# Patient Record
Sex: FEMALE | Race: WHITE | Employment: FULL TIME | URBAN - METROPOLITAN AREA
[De-identification: names, ages, dates, MRNs, and addresses within clinical notes are randomized per-mention and may not be internally consistent; named-entity substitution may affect disease eponyms.]

---

## 2018-11-16 ENCOUNTER — OFFICE VISIT (OUTPATIENT)
Dept: INTERNAL MEDICINE CLINIC | Age: 53
End: 2018-11-16
Payer: COMMERCIAL

## 2018-11-16 VITALS
OXYGEN SATURATION: 98 % | WEIGHT: 141 LBS | BODY MASS INDEX: 20.88 KG/M2 | HEART RATE: 100 BPM | SYSTOLIC BLOOD PRESSURE: 108 MMHG | HEIGHT: 69 IN | DIASTOLIC BLOOD PRESSURE: 64 MMHG

## 2018-11-16 DIAGNOSIS — Z00.00 ROUTINE PHYSICAL EXAMINATION: ICD-10-CM

## 2018-11-16 DIAGNOSIS — M25.50 POLYARTHRALGIA: ICD-10-CM

## 2018-11-16 DIAGNOSIS — Z76.89 ENCOUNTER TO ESTABLISH CARE: ICD-10-CM

## 2018-11-16 DIAGNOSIS — M79.18 MYOFASCIAL PAIN: Primary | ICD-10-CM

## 2018-11-16 PROCEDURE — 99204 OFFICE O/P NEW MOD 45 MIN: CPT | Performed by: FAMILY MEDICINE

## 2018-11-16 ASSESSMENT — PATIENT HEALTH QUESTIONNAIRE - PHQ9
1. LITTLE INTEREST OR PLEASURE IN DOING THINGS: 0
SUM OF ALL RESPONSES TO PHQ QUESTIONS 1-9: 0
2. FEELING DOWN, DEPRESSED OR HOPELESS: 0
SUM OF ALL RESPONSES TO PHQ9 QUESTIONS 1 & 2: 0
SUM OF ALL RESPONSES TO PHQ QUESTIONS 1-9: 0

## 2018-11-16 NOTE — PROGRESS NOTES
file prior to visit. No current facility-administered medications on file prior to visit. Review of Systems   Constitutional: Negative for activity change, appetite change, chills, fatigue, fever and unexpected weight change. HENT: Negative for congestion, nosebleeds, postnasal drip, rhinorrhea, sneezing, sore throat and trouble swallowing. Eyes: Negative for visual disturbance. Respiratory: Negative for cough, choking, shortness of breath and wheezing. Cardiovascular: Negative for chest pain and leg swelling. Gastrointestinal: Negative for abdominal pain, constipation, diarrhea, nausea and vomiting. Genitourinary: Negative for difficulty urinating, dysuria, vaginal discharge and vaginal pain. Musculoskeletal: Negative for arthralgias, back pain, neck pain and neck stiffness. Chronic pain issues are well-controlled   Skin: Negative for rash. Neurological: Negative for dizziness, weakness, numbness and headaches. Psychiatric/Behavioral: Negative for dysphoric mood and sleep disturbance. The patient is not nervous/anxious. Physical Exam   Constitutional: She is oriented to person, place, and time. She appears well-developed and well-nourished. No distress. Thin, pleasant   HENT:   Head: Normocephalic and atraumatic. Nose: Nose normal.   Mouth/Throat: Oropharynx is clear and moist.   Eyes: Conjunctivae and EOM are normal. Right eye exhibits no discharge. Left eye exhibits no discharge. Neck: Normal range of motion. Neck supple. No thyromegaly present. Cardiovascular: Normal rate, regular rhythm and normal heart sounds. No carotid bruits   Pulmonary/Chest: Effort normal and breath sounds normal. No respiratory distress. She has no wheezes. Abdominal: Soft. Bowel sounds are normal. She exhibits no distension and no mass. Musculoskeletal: Normal range of motion. She exhibits no edema. Lymphadenopathy:     She has no cervical adenopathy.    Neurological: She

## 2018-11-20 ASSESSMENT — ENCOUNTER SYMPTOMS
ABDOMINAL PAIN: 0
BACK PAIN: 0
WHEEZING: 0
VOMITING: 0
RHINORRHEA: 0
SORE THROAT: 0
NAUSEA: 0
CHOKING: 0
SHORTNESS OF BREATH: 0
COUGH: 0
TROUBLE SWALLOWING: 0
DIARRHEA: 0
CONSTIPATION: 0

## 2019-08-05 ENCOUNTER — OFFICE VISIT (OUTPATIENT)
Dept: FAMILY MEDICINE CLINIC | Age: 54
End: 2019-08-05
Payer: COMMERCIAL

## 2019-08-05 VITALS
OXYGEN SATURATION: 99 % | TEMPERATURE: 98.8 F | HEIGHT: 69 IN | HEART RATE: 80 BPM | SYSTOLIC BLOOD PRESSURE: 96 MMHG | DIASTOLIC BLOOD PRESSURE: 58 MMHG | WEIGHT: 143.8 LBS | BODY MASS INDEX: 21.3 KG/M2

## 2019-08-05 DIAGNOSIS — R30.0 DYSURIA: Primary | ICD-10-CM

## 2019-08-05 LAB
BILIRUBIN, POC: NEGATIVE
BLOOD URINE, POC: NORMAL
CLARITY, POC: NORMAL
COLOR, POC: NORMAL
GLUCOSE URINE, POC: NEGATIVE
KETONES, POC: NEGATIVE
LEUKOCYTE EST, POC: NORMAL
NITRITE, POC: NEGATIVE
PH, POC: 7
PROTEIN, POC: NEGATIVE
SPECIFIC GRAVITY, POC: 1.01
UROBILINOGEN, POC: 0.2

## 2019-08-05 PROCEDURE — 99203 OFFICE O/P NEW LOW 30 MIN: CPT | Performed by: FAMILY MEDICINE

## 2019-08-05 PROCEDURE — 81002 URINALYSIS NONAUTO W/O SCOPE: CPT | Performed by: FAMILY MEDICINE

## 2019-08-05 RX ORDER — CHLORAL HYDRATE 500 MG
1 CAPSULE ORAL DAILY
COMMUNITY

## 2019-08-05 RX ORDER — CIPROFLOXACIN 250 MG/1
250 TABLET, FILM COATED ORAL 2 TIMES DAILY
Qty: 6 TABLET | Refills: 0 | Status: SHIPPED | OUTPATIENT
Start: 2019-08-05 | End: 2019-08-08

## 2019-08-05 ASSESSMENT — ENCOUNTER SYMPTOMS
VOMITING: 0
SHORTNESS OF BREATH: 0
ABDOMINAL PAIN: 0
EYE PAIN: 0
NAUSEA: 0

## 2019-08-05 ASSESSMENT — PATIENT HEALTH QUESTIONNAIRE - PHQ9
2. FEELING DOWN, DEPRESSED OR HOPELESS: 0
SUM OF ALL RESPONSES TO PHQ QUESTIONS 1-9: 0
SUM OF ALL RESPONSES TO PHQ9 QUESTIONS 1 & 2: 0
1. LITTLE INTEREST OR PLEASURE IN DOING THINGS: 0
SUM OF ALL RESPONSES TO PHQ QUESTIONS 1-9: 0

## 2019-08-05 NOTE — PROGRESS NOTES
wheezing. Cardiovascular: Negative for chest pain and palpitations. Gastrointestinal: Negative for abdominal pain, blood in stool, constipation, diarrhea, nausea and vomiting. Genitourinary: Positive for dysuria. Negative for decreased urine volume, difficulty urinating, flank pain, hematuria, vaginal bleeding and vaginal discharge. Musculoskeletal: Negative for arthralgias and back pain. Skin: Negative for color change and rash. Neurological: Negative for dizziness, seizures, syncope and headaches. Psychiatric/Behavioral: Negative for dysphoric mood and sleep disturbance. The patient is not nervous/anxious. Objective:   Physical Exam   Constitutional: She is oriented to person, place, and time. She appears well-developed and well-nourished. No distress. HENT:   Head: Normocephalic. Mouth/Throat: Oropharynx is clear and moist. No oropharyngeal exudate. Eyes: Pupils are equal, round, and reactive to light. Conjunctivae and EOM are normal. Right eye exhibits no discharge. Left eye exhibits no discharge. Neck: Normal range of motion. Neck supple. No thyromegaly present. Cardiovascular: Normal rate, regular rhythm, normal heart sounds and intact distal pulses. No murmur heard. Pulmonary/Chest: Effort normal and breath sounds normal. She has no wheezes. Abdominal: Soft. Bowel sounds are normal. She exhibits no distension. There is no tenderness. There is no rebound and no guarding. Musculoskeletal: Normal range of motion. She exhibits no edema or tenderness. Lymphadenopathy:     She has no cervical adenopathy. Neurological: She is alert and oriented to person, place, and time. She displays normal reflexes. No cranial nerve deficit. Coordination normal.   Skin: Skin is warm. No rash noted. No erythema. Psychiatric: She has a normal mood and affect.  Her behavior is normal. Judgment and thought content normal.       Assessment:      Dysuria- check cx      Plan:      Orders Placed This Encounter   Procedures    URINE CULTURE     Order Specific Question:   Specify (ex-cath, midstream, cysto, etc)?      Answer:   midstream    POCT Urinalysis no Micro             MIKEL BOLIVAR, DO

## 2019-08-07 LAB — URINE CULTURE, ROUTINE: NORMAL

## 2019-08-07 NOTE — RESULT ENCOUNTER NOTE
Patient was advised of results and voiced understanding. She says she is still uncomfortable but symptoms are intermittent.

## 2019-08-11 ASSESSMENT — ENCOUNTER SYMPTOMS
WHEEZING: 0
COUGH: 0
BLOOD IN STOOL: 0
CONSTIPATION: 0
SINUS PRESSURE: 0
DIARRHEA: 0
COLOR CHANGE: 0
EYE REDNESS: 0
EYE DISCHARGE: 0
RHINORRHEA: 0
CHEST TIGHTNESS: 0
BACK PAIN: 0

## 2019-12-06 ENCOUNTER — OFFICE VISIT (OUTPATIENT)
Dept: FAMILY MEDICINE CLINIC | Age: 54
End: 2019-12-06
Payer: COMMERCIAL

## 2019-12-06 VITALS
OXYGEN SATURATION: 99 % | TEMPERATURE: 97.9 F | SYSTOLIC BLOOD PRESSURE: 106 MMHG | WEIGHT: 145 LBS | HEART RATE: 100 BPM | DIASTOLIC BLOOD PRESSURE: 60 MMHG | BODY MASS INDEX: 21.48 KG/M2 | HEIGHT: 69 IN

## 2019-12-06 DIAGNOSIS — Z00.00 ANNUAL PHYSICAL EXAM: Primary | ICD-10-CM

## 2019-12-06 DIAGNOSIS — Z11.59 ENCOUNTER FOR HEPATITIS C SCREENING TEST FOR LOW RISK PATIENT: ICD-10-CM

## 2019-12-06 LAB
A/G RATIO: 2 (ref 1.1–2.2)
ALBUMIN SERPL-MCNC: 4.4 G/DL (ref 3.4–5)
ALP BLD-CCNC: 58 U/L (ref 40–129)
ALT SERPL-CCNC: 9 U/L (ref 10–40)
ANION GAP SERPL CALCULATED.3IONS-SCNC: 13 MMOL/L (ref 3–16)
AST SERPL-CCNC: 19 U/L (ref 15–37)
BILIRUB SERPL-MCNC: 0.3 MG/DL (ref 0–1)
BUN BLDV-MCNC: 12 MG/DL (ref 7–20)
CALCIUM SERPL-MCNC: 9 MG/DL (ref 8.3–10.6)
CHLORIDE BLD-SCNC: 105 MMOL/L (ref 99–110)
CHOLESTEROL, TOTAL: 163 MG/DL (ref 0–199)
CO2: 22 MMOL/L (ref 21–32)
CREAT SERPL-MCNC: 0.7 MG/DL (ref 0.6–1.1)
GFR AFRICAN AMERICAN: >60
GFR NON-AFRICAN AMERICAN: >60
GLOBULIN: 2.2 G/DL
GLUCOSE BLD-MCNC: 93 MG/DL (ref 70–99)
HDLC SERPL-MCNC: 80 MG/DL (ref 40–60)
HEPATITIS C ANTIBODY INTERPRETATION: NORMAL
LDL CHOLESTEROL CALCULATED: 73 MG/DL
POTASSIUM SERPL-SCNC: 4.1 MMOL/L (ref 3.5–5.1)
SODIUM BLD-SCNC: 140 MMOL/L (ref 136–145)
TOTAL PROTEIN: 6.6 G/DL (ref 6.4–8.2)
TRIGL SERPL-MCNC: 49 MG/DL (ref 0–150)
VLDLC SERPL CALC-MCNC: 10 MG/DL

## 2019-12-06 PROCEDURE — 99396 PREV VISIT EST AGE 40-64: CPT | Performed by: FAMILY MEDICINE

## 2019-12-06 PROCEDURE — 36415 COLL VENOUS BLD VENIPUNCTURE: CPT | Performed by: FAMILY MEDICINE

## 2019-12-06 ASSESSMENT — ENCOUNTER SYMPTOMS
WHEEZING: 0
COUGH: 0
ABDOMINAL PAIN: 0
CONSTIPATION: 0
VOMITING: 0
NAUSEA: 0
SHORTNESS OF BREATH: 0
BLOOD IN STOOL: 0
RHINORRHEA: 0
CHEST TIGHTNESS: 0
SINUS PRESSURE: 0
EYE DISCHARGE: 0
EYE REDNESS: 0
EYE PAIN: 0
DIARRHEA: 0

## 2019-12-07 LAB
ESTIMATED AVERAGE GLUCOSE: 99.7 MG/DL
HBA1C MFR BLD: 5.1 %

## 2020-05-19 ENCOUNTER — TELEPHONE (OUTPATIENT)
Dept: FAMILY MEDICINE CLINIC | Age: 55
End: 2020-05-19

## 2020-06-11 ENCOUNTER — TELEPHONE (OUTPATIENT)
Dept: FAMILY MEDICINE CLINIC | Age: 55
End: 2020-06-11

## 2020-06-11 NOTE — TELEPHONE ENCOUNTER
Pt returned call.      She went [place] [facility] [place] [facility]  [phone]

## 2020-06-12 ENCOUNTER — OFFICE VISIT (OUTPATIENT)
Dept: FAMILY MEDICINE CLINIC | Age: 55
End: 2020-06-12
Payer: COMMERCIAL

## 2020-06-12 VITALS
BODY MASS INDEX: 20.79 KG/M2 | OXYGEN SATURATION: 99 % | HEIGHT: 69 IN | DIASTOLIC BLOOD PRESSURE: 58 MMHG | WEIGHT: 140.4 LBS | SYSTOLIC BLOOD PRESSURE: 98 MMHG | TEMPERATURE: 98.2 F | HEART RATE: 80 BPM

## 2020-06-12 PROCEDURE — 36415 COLL VENOUS BLD VENIPUNCTURE: CPT | Performed by: FAMILY MEDICINE

## 2020-06-12 PROCEDURE — 99214 OFFICE O/P EST MOD 30 MIN: CPT | Performed by: FAMILY MEDICINE

## 2020-06-12 ASSESSMENT — PATIENT HEALTH QUESTIONNAIRE - PHQ9
1. LITTLE INTEREST OR PLEASURE IN DOING THINGS: 0
SUM OF ALL RESPONSES TO PHQ QUESTIONS 1-9: 0
SUM OF ALL RESPONSES TO PHQ9 QUESTIONS 1 & 2: 0
SUM OF ALL RESPONSES TO PHQ QUESTIONS 1-9: 0
2. FEELING DOWN, DEPRESSED OR HOPELESS: 0

## 2020-06-12 ASSESSMENT — ENCOUNTER SYMPTOMS
SINUS PRESSURE: 0
EYE REDNESS: 0
EYE DISCHARGE: 0
WHEEZING: 0
DIARRHEA: 0
COUGH: 0
CONSTIPATION: 0
CHEST TIGHTNESS: 0
BLOOD IN STOOL: 0
ABDOMINAL PAIN: 0
EYE PAIN: 0
RHINORRHEA: 0
VOMITING: 0
SHORTNESS OF BREATH: 0

## 2020-06-12 NOTE — PROGRESS NOTES
Room:         blank  Gender:       Female                   Technician:     :          1965               Requested By: Concha Tejeda   Order Number: [id number]                Reading MD:   Jocelyne Gates MD                                   Measurements  Intervals                              Axis            Rate:         79                       P:            40  NY:           164                      QRS:          61  QRSD:         66                       T:            33  QT:           392                                      QTc:          414                                                                 Interpretive Statements  SINUS RHYTHM  Electronically Signed On 2020 17:19:23 EDT by Jocelyne Gates MD   Other Result Information   Interface, Incoming Cardiant Results - 2020  5:19 PM EDT                                [facility]                                               Test Date:    2020 16:20:40  Pat Name:     Terrial Dove            Department:   [facility]  Patient ID:   [id number]                 Room:         blank  Gender:       Female                   Technician:     :          [id number]               Requested By: Concha Tejeda   Order Number: [id number]                Reading MD:   Jocelyne Gates MD                                   Measurements  Intervals                              Axis            Rate:         67                       P:            40  NY:           164                      QRS:          61  QRSD:         78                       T:            33  QT:           392                                      QTc:          414                                                                 Interpretive Statements  SINUS RHYTHM  Electronically Signed On 2020 17:19:23 EDT by Jocelyne Gates MD     IMPRESSION:    No acute pulmonary disease.     Signed By: Yusef Miramontes MD   Result Narrative   EXAM: Chest PA and Lateral

## 2020-06-12 NOTE — PATIENT INSTRUCTIONS

## 2020-06-13 LAB
A/G RATIO: 2.6 (ref 1.1–2.2)
ALBUMIN SERPL-MCNC: 4.6 G/DL (ref 3.4–5)
ALP BLD-CCNC: 54 U/L (ref 40–129)
ALT SERPL-CCNC: 9 U/L (ref 10–40)
ANION GAP SERPL CALCULATED.3IONS-SCNC: 12 MMOL/L (ref 3–16)
AST SERPL-CCNC: 17 U/L (ref 15–37)
BASOPHILS ABSOLUTE: 0 K/UL (ref 0–0.2)
BASOPHILS RELATIVE PERCENT: 1 %
BILIRUB SERPL-MCNC: 0.3 MG/DL (ref 0–1)
BUN BLDV-MCNC: 9 MG/DL (ref 7–20)
CALCIUM SERPL-MCNC: 9.3 MG/DL (ref 8.3–10.6)
CHLORIDE BLD-SCNC: 103 MMOL/L (ref 99–110)
CO2: 22 MMOL/L (ref 21–32)
CORTISOL TOTAL: 8.8 UG/DL
CREAT SERPL-MCNC: 0.7 MG/DL (ref 0.6–1.1)
EOSINOPHILS ABSOLUTE: 0 K/UL (ref 0–0.6)
EOSINOPHILS RELATIVE PERCENT: 1 %
GFR AFRICAN AMERICAN: >60
GFR NON-AFRICAN AMERICAN: >60
GLOBULIN: 1.8 G/DL
GLUCOSE BLD-MCNC: 84 MG/DL (ref 70–99)
HCT VFR BLD CALC: 35.9 % (ref 36–48)
HEMOGLOBIN: 11.3 G/DL (ref 12–16)
LYMPHOCYTES ABSOLUTE: 1.2 K/UL (ref 1–5.1)
LYMPHOCYTES RELATIVE PERCENT: 30.8 %
MCH RBC QN AUTO: 26.7 PG (ref 26–34)
MCHC RBC AUTO-ENTMCNC: 31.3 G/DL (ref 31–36)
MCV RBC AUTO: 85.3 FL (ref 80–100)
MONOCYTES ABSOLUTE: 0.5 K/UL (ref 0–1.3)
MONOCYTES RELATIVE PERCENT: 11.7 %
NEUTROPHILS ABSOLUTE: 2.2 K/UL (ref 1.7–7.7)
NEUTROPHILS RELATIVE PERCENT: 55.5 %
PDW BLD-RTO: 17.7 % (ref 12.4–15.4)
PLATELET # BLD: 245 K/UL (ref 135–450)
PMV BLD AUTO: 10.9 FL (ref 5–10.5)
POTASSIUM SERPL-SCNC: 4.5 MMOL/L (ref 3.5–5.1)
RBC # BLD: 4.21 M/UL (ref 4–5.2)
SODIUM BLD-SCNC: 137 MMOL/L (ref 136–145)
TOTAL PROTEIN: 6.4 G/DL (ref 6.4–8.2)
TSH REFLEX: 1.73 UIU/ML (ref 0.27–4.2)
WBC # BLD: 3.9 K/UL (ref 4–11)

## 2020-06-19 ASSESSMENT — ENCOUNTER SYMPTOMS: COLOR CHANGE: 0

## 2020-06-25 DIAGNOSIS — R06.02 SOB (SHORTNESS OF BREATH): ICD-10-CM

## 2020-06-25 DIAGNOSIS — D86.9 SARCOIDOSIS: ICD-10-CM

## 2020-07-21 ENCOUNTER — HOSPITAL ENCOUNTER (OUTPATIENT)
Dept: CARDIOLOGY | Age: 55
Discharge: HOME | End: 2020-07-21
Payer: COMMERCIAL

## 2020-07-21 LAB
LV EF: 55 %
LVEF MODALITY: NORMAL

## 2020-07-21 PROCEDURE — 93306 TTE W/DOPPLER COMPLETE: CPT

## 2021-04-07 LAB — MAMMOGRAPHY, EXTERNAL: NORMAL

## 2021-12-15 ENCOUNTER — OFFICE VISIT (OUTPATIENT)
Dept: FAMILY MEDICINE CLINIC | Age: 56
End: 2021-12-15
Payer: COMMERCIAL

## 2021-12-15 VITALS
BODY MASS INDEX: 20.26 KG/M2 | DIASTOLIC BLOOD PRESSURE: 62 MMHG | WEIGHT: 136.8 LBS | SYSTOLIC BLOOD PRESSURE: 104 MMHG | OXYGEN SATURATION: 99 % | HEIGHT: 69 IN | HEART RATE: 72 BPM | TEMPERATURE: 97.2 F

## 2021-12-15 DIAGNOSIS — Z00.00 ANNUAL PHYSICAL EXAM: Primary | ICD-10-CM

## 2021-12-15 DIAGNOSIS — Z11.4 SCREENING FOR HIV WITHOUT PRESENCE OF RISK FACTORS: ICD-10-CM

## 2021-12-15 DIAGNOSIS — Z23 NEED FOR SHINGLES VACCINE: ICD-10-CM

## 2021-12-15 LAB
A/G RATIO: 2 (ref 1.1–2.2)
ALBUMIN SERPL-MCNC: 4.6 G/DL (ref 3.4–5)
ALP BLD-CCNC: 74 U/L (ref 40–129)
ALT SERPL-CCNC: 8 U/L (ref 10–40)
ANION GAP SERPL CALCULATED.3IONS-SCNC: 12 MMOL/L (ref 3–16)
AST SERPL-CCNC: 19 U/L (ref 15–37)
BILIRUB SERPL-MCNC: 0.5 MG/DL (ref 0–1)
BUN BLDV-MCNC: 9 MG/DL (ref 7–20)
CALCIUM SERPL-MCNC: 9.6 MG/DL (ref 8.3–10.6)
CHLORIDE BLD-SCNC: 103 MMOL/L (ref 99–110)
CHOLESTEROL, TOTAL: 184 MG/DL (ref 0–199)
CO2: 23 MMOL/L (ref 21–32)
CREAT SERPL-MCNC: 0.7 MG/DL (ref 0.6–1.1)
GFR AFRICAN AMERICAN: >60
GFR NON-AFRICAN AMERICAN: >60
GLUCOSE BLD-MCNC: 90 MG/DL (ref 70–99)
HDLC SERPL-MCNC: 68 MG/DL (ref 40–60)
LDL CHOLESTEROL CALCULATED: 99 MG/DL
POTASSIUM SERPL-SCNC: 4.9 MMOL/L (ref 3.5–5.1)
SODIUM BLD-SCNC: 138 MMOL/L (ref 136–145)
TOTAL PROTEIN: 6.9 G/DL (ref 6.4–8.2)
TRIGL SERPL-MCNC: 83 MG/DL (ref 0–150)
VLDLC SERPL CALC-MCNC: 17 MG/DL

## 2021-12-15 PROCEDURE — 36415 COLL VENOUS BLD VENIPUNCTURE: CPT | Performed by: FAMILY MEDICINE

## 2021-12-15 PROCEDURE — 90750 HZV VACC RECOMBINANT IM: CPT | Performed by: FAMILY MEDICINE

## 2021-12-15 PROCEDURE — 90471 IMMUNIZATION ADMIN: CPT | Performed by: FAMILY MEDICINE

## 2021-12-15 PROCEDURE — 99396 PREV VISIT EST AGE 40-64: CPT | Performed by: FAMILY MEDICINE

## 2021-12-15 SDOH — ECONOMIC STABILITY: FOOD INSECURITY: WITHIN THE PAST 12 MONTHS, THE FOOD YOU BOUGHT JUST DIDN'T LAST AND YOU DIDN'T HAVE MONEY TO GET MORE.: NEVER TRUE

## 2021-12-15 SDOH — ECONOMIC STABILITY: FOOD INSECURITY: WITHIN THE PAST 12 MONTHS, YOU WORRIED THAT YOUR FOOD WOULD RUN OUT BEFORE YOU GOT MONEY TO BUY MORE.: NEVER TRUE

## 2021-12-15 ASSESSMENT — ENCOUNTER SYMPTOMS
EYE DISCHARGE: 0
DIARRHEA: 0
COLOR CHANGE: 0
VOMITING: 0
BLOOD IN STOOL: 0
ABDOMINAL PAIN: 0
SINUS PRESSURE: 0
CHEST TIGHTNESS: 0
EYE PAIN: 0
NAUSEA: 0
SHORTNESS OF BREATH: 0
WHEEZING: 0
RHINORRHEA: 0
EYE REDNESS: 0
COUGH: 0
CONSTIPATION: 0
BACK PAIN: 0

## 2021-12-15 ASSESSMENT — PATIENT HEALTH QUESTIONNAIRE - PHQ9
2. FEELING DOWN, DEPRESSED OR HOPELESS: 0
SUM OF ALL RESPONSES TO PHQ9 QUESTIONS 1 & 2: 0
1. LITTLE INTEREST OR PLEASURE IN DOING THINGS: 0
SUM OF ALL RESPONSES TO PHQ QUESTIONS 1-9: 0

## 2021-12-15 ASSESSMENT — SOCIAL DETERMINANTS OF HEALTH (SDOH): HOW HARD IS IT FOR YOU TO PAY FOR THE VERY BASICS LIKE FOOD, HOUSING, MEDICAL CARE, AND HEATING?: NOT HARD AT ALL

## 2021-12-15 NOTE — PATIENT INSTRUCTIONS

## 2021-12-15 NOTE — PROGRESS NOTES
Subjective:      Patient ID: Curtis Cárdenas is a 61 y.o. female. HPI  Chief Complaint   Patient presents with    Annual Exam     Patient is here for a physical, she is fasting for blood work and declined a flu shot. Here for CPE. Nonsmoker. utd on dental and vision exams  Exercises. Weight stable  Sees Dr. Milton Caballero for gyn  Proscan for mammography  Has not done colonscopy  Gets acupuncture    Curtis Cárdenas is a 61 y.o. female [facility] the [facility]  Patient Active Problem List    Diagnosis Date Noted    Myofascial pain     Polyarthralgia     Sarcoidosis      Current Outpatient Medications   Medication Sig Dispense Refill    Omega-3 Fatty Acids (FISH OIL) 1000 MG CAPS Take 1 capsule by mouth daily      Cholecalciferol (VITAMIN D3) 1000 units CAPS Take 1 capsule by mouth daily      Multiple Vitamins-Minerals (MULTIVITAMIN PO) Take 1 tablet by mouth daily      EVENING PRIMROSE OIL PO Take by mouth daily       No current facility-administered medications for this visit. 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 Nhung Santos rheum    Polyarthralgia     chronic    Sarcoidosis     [facility] Melissa Cue[facility], Dr. Nhung Santos Rheumatology. Dr. Anthony Shukla. Past Surgical History:   Procedure Laterality Date    LEG SURGERY  10/2004    cancer     Family History   Problem Relation Age of Onset    High Blood Pressure Mother     Stroke Mother      Social History     Tobacco Use    Smoking status: Never Smoker    Smokeless tobacco: Never Used   Substance Use Topics    Alcohol use: Yes     Comment: social       Review of Systems   Constitutional: Negative for chills, fatigue, fever and unexpected weight change.    HENT: Negative for ear discharge, ear pain, hearing loss, rhinorrhea, sinus pressure and tinnitus. Eyes: Negative for pain, discharge, redness and visual disturbance. Respiratory: Negative for cough, chest tightness, shortness of breath and wheezing. Cardiovascular: Negative for chest pain and palpitations. Gastrointestinal: Negative for abdominal pain, blood in stool, constipation, diarrhea, nausea and vomiting. Genitourinary: Negative for decreased urine volume, difficulty urinating, dysuria, flank pain, hematuria, vaginal bleeding and vaginal discharge. Musculoskeletal: Negative for arthralgias and back pain. Skin: Negative for color change and rash. Neurological: Negative for dizziness, seizures, syncope and headaches. Psychiatric/Behavioral: Negative for dysphoric mood and sleep disturbance. The patient is not nervous/anxious. Objective:   Physical Exam  Constitutional:       General: She is not in acute distress. Appearance: She is well-developed. HENT:      Head: Normocephalic. Right Ear: Tympanic membrane, ear canal and external ear normal.      Left Ear: Tympanic membrane, ear canal and external ear normal.      Nose: Nose normal. No congestion or rhinorrhea. Mouth/Throat:      Pharynx: No oropharyngeal exudate or posterior oropharyngeal erythema. Eyes:      General: No scleral icterus. Right eye: No discharge. Left eye: No discharge. Extraocular Movements: Extraocular movements intact. Conjunctiva/sclera: Conjunctivae normal.      Pupils: Pupils are equal, round, and reactive to light. Cardiovascular:      Rate and Rhythm: Normal rate and regular rhythm. Heart sounds: Normal heart sounds. No murmur heard. Pulmonary:      Effort: Pulmonary effort is normal.      Breath sounds: Normal breath sounds. No wheezing. Abdominal:      General: Bowel sounds are normal. There is no distension. Palpations: Abdomen is soft. Tenderness:  There is no abdominal tenderness. There is no guarding or rebound. Musculoskeletal:         General: No tenderness. Normal range of motion. Cervical back: Neck supple. Skin:     General: Skin is warm. Coloration: Skin is not jaundiced or pale. Neurological:      General: No focal deficit present. Mental Status: She is alert and oriented to person, place, and time. Mental status is at baseline. Cranial Nerves: No cranial nerve deficit. Sensory: No sensory deficit. Motor: No weakness. Coordination: Coordination normal.   Psychiatric:         Behavior: Behavior normal.         Thought Content: Thought content normal.         Judgment: Judgment normal.         Assessment:      CPE      Plan:      Patient Counseling:  --Nutrition: Stressed importance of moderation in sodium/caffeine intake, saturated fat and cholesterol, caloric balance, sufficient intake of fresh fruits, vegetables, fiber, calcium, iron, and 1 mg of folate supplement per day (for females capable of pregnancy). --Exercise: Stressed the importance of regular exercise. --Dental health: Discussed importance of regular tooth brushing, flossing, and dental visits. --Immunizations reviewed. Daily sunscreen recommended. Yearly FSE discussed  --Discussed benefits of screening colonoscopy.     Orders Placed This Encounter   Procedures    [facility])    LIPID PANEL     Order Specific Question:   Is Patient Fasting?/# of Hours     Answer:   12    COMPREHENSIVE METABOLIC PANEL    HEMOGLOBIN A1C    HIV-1 AND HIV-2 ANTIBODIES             MIKEL [address], DO

## 2021-12-16 LAB
ESTIMATED AVERAGE GLUCOSE: 102.5 MG/DL
HBA1C MFR BLD: 5.2 %
HIV AG/AB: NORMAL
HIV ANTIGEN: NORMAL
HIV-1 ANTIBODY: NORMAL
HIV-2 AB: NORMAL

## 2021-12-16 NOTE — RESULT ENCOUNTER NOTE
Patient was advised of results and voiced understanding. Form has been filled out and faxed to [facility].

## 2022-01-15 ENCOUNTER — HOSPITAL ENCOUNTER (EMERGENCY)
Age: 57
Discharge: HOME | End: 2022-01-15
Payer: COMMERCIAL

## 2022-01-15 VITALS
BODY MASS INDEX: 19.99 KG/M2 | TEMPERATURE: 98.4 F | DIASTOLIC BLOOD PRESSURE: 58 MMHG | RESPIRATION RATE: 19 BRPM | OXYGEN SATURATION: 100 % | HEART RATE: 80 BPM | SYSTOLIC BLOOD PRESSURE: 114 MMHG | WEIGHT: 135 LBS | HEIGHT: 69 IN

## 2022-01-15 DIAGNOSIS — S61.210A LACERATION OF RIGHT INDEX FINGER WITHOUT FOREIGN BODY WITHOUT DAMAGE TO NAIL, INITIAL ENCOUNTER: Primary | ICD-10-CM

## 2022-01-15 PROCEDURE — 90715 TDAP VACCINE 7 YRS/> IM: CPT

## 2022-01-15 PROCEDURE — 6360000002 HC RX W HCPCS

## 2022-01-15 PROCEDURE — 90471 IMMUNIZATION ADMIN: CPT

## 2022-01-15 PROCEDURE — 99283 EMERGENCY DEPT VISIT LOW MDM: CPT

## 2022-01-15 RX ADMIN — TETANUS TOXOID, REDUCED DIPHTHERIA TOXOID AND ACELLULAR PERTUSSIS VACCINE, ADSORBED 1 ML: 5; 2.5; 8; 8; 2.5 SUSPENSION INTRAMUSCULAR at 20:15

## 2022-01-15 ASSESSMENT — PAIN SCALES - GENERAL: PAINLEVEL_OUTOF10: 5

## 2022-01-16 NOTE — ED PROVIDER NOTES
[facility]  ED      CHIEF COMPLAINT  Laceration (right index finger cut on mandolin)      SHARED SERVICE VISIT  Evaluated by YONY. My supervising physician was available for consultation. HISTORY OF PRESENT ILLNESS  Hermes Mason is a 61 y.o. female who presents to the ED complaining of laceration to her right index finger. Patient states that she was using a mandolin to make coleslaw when she sliced the tip of her finger. This occurred just prior to arrival.  Patient states she is concerned about the bleeding. She is not on anticoagulation. Believes her tetanus needs to be updated. No other complaints, modifying factors or associated symptoms. Nursing notes reviewed. 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 Laura Rangel     chronic    Sarcoidosis     [facility] Cyndy Dodge, Dr. Gilda Espinosa Rheumatology. Dr. Nael Gunn.        Past Surgical History:   Procedure Laterality Date    LEG SURGERY  10/2004    cancer     Family History   Problem Relation Age of Onset    High Blood Pressure Mother     Stroke Mother      Social History     Socioeconomic History    Marital status:      Spouse name: Not on file    Number of children: Not on file    Years of education: Not on file    Highest education level: Not on file   Occupational History    Not on file   Tobacco Use    Smoking status: Never Smoker    Smokeless tobacco: Never Used   Vaping Use    Vaping Use: Never used   Substance and Sexual Activity    Alcohol use: Yes     Comment: social    Drug use: No    Sexual activity: Yes     Partners: Male   Other Topics Concern    Not on file   Social History Narrative    Not on file     Social Determinants of Health     Financial Resource Strain: Low Risk     Difficulty of Paying Living Expenses: Not hard at all   Food Insecurity: No Food Insecurity    Worried About Running Out of Food in the Last Year: Never true    Jarod of Food in the Last Year: Never true   Transportation Needs:     Lack of Transportation (Medical): Not on file    Lack of Transportation (Non-Medical):  Not on file   Physical Activity:     Days of Exercise per Week: Not on file    Minutes of Exercise per Session: Not on file   Stress:     Feeling of Stress : Not on file   Social Connections:     Frequency of Communication with Friends and Family: Not on file    Frequency of Social Gatherings with Friends and Family: Not on file    Attends Mormonism Services: Not on file    Active Member of [address] [place] [address] [employer] or Organizations: Not on file    Attends Club or Organization Meetings: Not on file    Marital Status: Not on file   Intimate Partner Violence:     Fear of Current or Ex-Partner: Not on file    Emotionally Abused: Not on file    Physically Abused: Not on file    Sexually Abused: Not on file   Housing Stability:     Unable to Pay for Housing in the Last Year: Not on file    Number of [place] in the Last Year: Not on file    Unstable Housing in the Last Year: Not on file     Current Facility-Administered Medications   Medication Dose Route Frequency Provider Last Rate Last Admin    tetanus-diphth-acell pertussis (BOOSTRIX) injection 0.5 mL  0.5 mL IntraMUSCular Once Chapito Celis PA-C         Current Outpatient Medications   Medication Sig Dispense Refill    Omega-3 Fatty Acids (FISH OIL) 1000 MG CAPS Take 1 capsule by mouth daily      Cholecalciferol (VITAMIN D3) 1000 units CAPS Take 1 capsule by mouth daily      Multiple Vitamins-Minerals (MULTIVITAMIN PO) Take 1 tablet by mouth daily       Allergies   Allergen Reactions    Bactrim [Sulfamethoxazole-Trimethoprim] Hives and Rash       REVIEW OF SYSTEMS  10 systems reviewed, pertinent positives per HPI otherwise noted to be negative    PHYSICAL EXAM  BP (!) 114/58   Pulse 80   Temp 98.4 °F (36.9 °C) (Oral)   Resp 19   Ht 5' 9\" (1.753 m)   Wt 135 lb (61.2 kg)   SpO2 100%   BMI 19.94 kg/m²   GENERAL APPEARANCE: Awake and alert. Cooperative. HEAD: Normocephalic. Atraumatic. EYES: EOM's grossly intact. ENT: Mucous membranes are moist.   NECK: Supple. HEART: RRR. No murmurs. LUNGS: Respirations unlabored. CTAB. Good air exchange. Speaking comfortably in full sentences. ABDOMEN: Soft. Non-distended. Non-tender. No guarding or rebound. No masses. No organomegaly. EXTREMITIES: Superficial laceration to the distal tip of the right index finger. Surface area of half centimeter by half centimeter. No peripheral edema. Moves all extremities equally. All extremities neurovascularly intact. Cap refills less than 2 seconds. SKIN: Warm and dry. Laceration described above. NEUROLOGICAL: Alert and oriented. CN's 2-12 intact. No gross facial drooping. Strength 5/5, sensation intact. PSYCHIATRIC: Normal mood and affect. RADIOLOGY  No orders to display       LABS  Labs Reviewed - No data to display    PROCEDURES  Unless otherwise noted below, none  Procedures        MDM  MDM  57-year female resents emergency department for evaluation of a laceration that occurred when she was using a mandolin to her right index finger. Tetanus will be updated today. 1 cc of lidocaine 1% was injected to the area due to the pain and bleeding was controlled with Surgifoam and pressure. We will plan discharge patient home on bleeding precautions as well as infection precautions. In the presentation of the laceration there is nothing that would require closure. Therefore hemostasis was obtained patient was observed emergency department for roughly 45 minutes with controlling the bleeding. Discharged home. Low risk for any neurovascular or tendinous injuries.     DISPOSITION  Patient was discharged to home in good condition. CLINICAL IMPRESSION  1.  Laceration of right index finger without foreign body without damage to nail, initial encounter            Chapito Celis PA-C  01/15/22 2025

## 2022-01-16 NOTE — ED NOTES
Discharge instructions explained. Patient verbalized understanding and denies any other concerns or complaints at this time. Patient vital signs stable and no acute signs or symptoms of distress noted at discharge. Patient deemed clinically stable. Patient d/c home with spouse.      Karel Richardson RN  01/15/22 2020

## 2022-02-22 ENCOUNTER — NURSE ONLY (OUTPATIENT)
Dept: FAMILY MEDICINE CLINIC | Age: 57
End: 2022-02-22
Payer: COMMERCIAL

## 2022-02-22 DIAGNOSIS — Z23 NEED FOR SHINGLES VACCINE: Primary | ICD-10-CM

## 2022-02-22 PROCEDURE — 90471 IMMUNIZATION ADMIN: CPT | Performed by: FAMILY MEDICINE

## 2022-02-22 PROCEDURE — 90750 HZV VACC RECOMBINANT IM: CPT | Performed by: FAMILY MEDICINE

## 2022-06-15 SDOH — HEALTH STABILITY: PHYSICAL HEALTH: ON AVERAGE, HOW MANY DAYS PER WEEK DO YOU ENGAGE IN MODERATE TO STRENUOUS EXERCISE (LIKE A BRISK WALK)?: 1 DAY

## 2022-06-15 SDOH — HEALTH STABILITY: PHYSICAL HEALTH: ON AVERAGE, HOW MANY MINUTES DO YOU ENGAGE IN EXERCISE AT THIS LEVEL?: 90 MIN

## 2022-06-15 ASSESSMENT — SOCIAL DETERMINANTS OF HEALTH (SDOH)

## 2022-06-16 ASSESSMENT — PATIENT HEALTH QUESTIONNAIRE - PHQ9
SUM OF ALL RESPONSES TO PHQ QUESTIONS 1-9: 0
SUM OF ALL RESPONSES TO PHQ QUESTIONS 1-9: 0
1. LITTLE INTEREST OR PLEASURE IN DOING THINGS: 0
SUM OF ALL RESPONSES TO PHQ QUESTIONS 1-9: 0
2. FEELING DOWN, DEPRESSED OR HOPELESS: 0
SUM OF ALL RESPONSES TO PHQ9 QUESTIONS 1 & 2: 0
SUM OF ALL RESPONSES TO PHQ QUESTIONS 1-9: 0

## 2022-06-16 ASSESSMENT — ANXIETY QUESTIONNAIRES
6. BECOMING EASILY ANNOYED OR IRRITABLE: 0
7. FEELING AFRAID AS IF SOMETHING AWFUL MIGHT HAPPEN: 0
IF YOU CHECKED OFF ANY PROBLEMS ON THIS QUESTIONNAIRE, HOW DIFFICULT HAVE THESE PROBLEMS MADE IT FOR YOU TO DO YOUR WORK, TAKE CARE OF THINGS AT HOME, OR GET ALONG WITH OTHER PEOPLE: NOT DIFFICULT AT ALL
2. NOT BEING ABLE TO STOP OR CONTROL WORRYING: 0
4. TROUBLE RELAXING: 0
1. FEELING NERVOUS, ANXIOUS, OR ON EDGE: 0
5. BEING SO RESTLESS THAT IT IS HARD TO SIT STILL: 0
3. WORRYING TOO MUCH ABOUT DIFFERENT THINGS: 0
GAD7 TOTAL SCORE: 0

## 2022-06-16 NOTE — PROGRESS NOTES
2022    TELEHEALTH EVALUATION -- Audio/Visual (During ZKOEF-31 public health emergency)    HPI:    Schuyler Comment (:  1965) has requested an audio/video evaluation for the following concern(s):    Chief Complaint   Patient presents with   Ollie Edwards Doctor     Number patient of Dr. Conrado Marquis at [facility]. Last physical on December 15, 2021 with labs within normal limits. A CT done on 2020 for shortness of breath showed 2 noncalcified nodules on the left side and a short follow-up is recommended in 3 to 4 months. Past medical history includes a left lower extremity sarcoma removed in  and sarcoidosis diagnosed in . States that she has had no other issues with her sarcoma. Also states she looked for holistic doctors and went to [place] [facility]. Also made lifestyle changes, she is in remission and feels great mentally, emotionally, and physically/    Has been caring for her ill  and is having difficulty.  has become angry and personality has changed. Pt was seeing counseling for a while but feels she needs coaching. 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 Review of Systems    Prior to Visit Medications    Medication Sig Taking?  Authorizing Provider   Omega-3 Fatty Acids (FISH OIL) 1000 MG CAPS Take 1 capsule by mouth daily Yes Historical Provider, MD   Multiple Vitamins-Minerals (MULTIVITAMIN PO) Take 1 tablet by mouth daily Yes Historical Provider, MD       Social History     Tobacco Use    Smoking status: Never Smoker    Smokeless tobacco: Never Used   Vaping Use    Vaping Use: Never used   Substance Use Topics    Alcohol use: Yes     Comment: social    Drug use: No        Allergies   Allergen Reactions    Bactrim [Sulfamethoxazole-Trimethoprim] Hives and Rash   ,   Past Medical History:   Diagnosis Date    Anemia     Cancer ([address] 10/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 Virginia Talley rheum    Polyarthralgia     chronic    Sarcoidosis     [facility] Dimitri Agu[facility], Dr. Virginia Talley Rheumatology. Dr. Carolina Mccrary ,   Past Surgical History:   Procedure Laterality Date    LEG SURGERY  10/2004    cancer       PHYSICAL EXAMINATION:  [ INSTRUCTIONS:  \"[x]\" Indicates a positive item  \"[]\" Indicates a negative item  -- DELETE ALL ITEMS NOT EXAMINED]  Vital Signs: (As obtained by patient/caregiver or practitioner observation)    Height  -    5' 9\"         Weight -    140 lb              Constitutional: [x] Appears well-developed and well-nourished [x] No apparent distress      [] Abnormal-   Mental status  [x] Alert and awake  [x] Oriented to person/place/time [x]Able to follow commands      Eyes:  EOM    [x]  Normal  [] Abnormal-  Sclera  []  Normal  [] Abnormal -         Discharge []  None visible  [] Abnormal -    HENT:   [x] Normocephalic, atraumatic. [] Abnormal   [] Mouth/Throat: Mucous membranes are moist.     External Ears [x] Normal  [] Abnormal-     Neck: [x] No visualized mass     Pulmonary/Chest: [x] Respiratory effort normal.  [x] No visualized signs of difficulty breathing or respiratory distress        [] Abnormal-      Musculoskeletal:   [] Normal gait with no signs of ataxia         [x] Normal range of motion of neck        [] Abnormal-       Neurological:        [x] No Facial Asymmetry (Cranial nerve 7 motor function) (limited exam to video visit)          [x] No gaze palsy        [] Abnormal-         Skin:        [x] No significant exanthematous lesions or discoloration noted on facial skin         [] Abnormal-            Psychiatric:       [x] Normal Affect [] No Hallucinations        [] Abnormal-     Other pertinent observable physical exam findings-     ASSESSMENT/PLAN:  1. Nodule of left lung  - CT CHEST W CONTRAST; Future    2.  Polyarthralgia  - TSH with Reflex; Future  - Lipid Panel; Future  - CBC with Auto Differential; Future  - Comprehensive Metabolic Panel; Future    3. Sarcoidosis  - stable  - TSH with Reflex; Future  - Lipid Panel; Future  - CBC with Auto Differential; Future  - Comprehensive Metabolic Panel; Future    4. Stress due to illness of family member    Return in about 6 months (around 12/17/2022) for Physical Exam.    Nacho Rivera, was evaluated through a synchronous (real-time) audio-video encounter. The patient (or guardian if applicable) is aware that this is a billable service. Verbal consent to proceed has been obtained within the past 12 months. The visit was conducted pursuant to the emergency declaration under the [facility], [address] [place] [address] authority and the [employer] Act. Patient identification was verified, and a caregiver was present when appropriate. The patient was located in a state where the provider was credentialed to provide care. Total time spent on this encounter: Not billed by time    --Juan Leyva DO on 6/17/2022 at 9:30 AM    An electronic signature was used to authenticate this note.

## 2022-06-17 ENCOUNTER — TELEMEDICINE (OUTPATIENT)
Dept: FAMILY MEDICINE CLINIC | Age: 57
End: 2022-06-17
Payer: COMMERCIAL

## 2022-06-17 DIAGNOSIS — M25.50 POLYARTHRALGIA: ICD-10-CM

## 2022-06-17 DIAGNOSIS — D86.9 SARCOIDOSIS: ICD-10-CM

## 2022-06-17 DIAGNOSIS — Z63.79 STRESS DUE TO ILLNESS OF FAMILY MEMBER: ICD-10-CM

## 2022-06-17 DIAGNOSIS — R91.1 NODULE OF LEFT LUNG: Primary | ICD-10-CM

## 2022-06-17 PROCEDURE — 99214 OFFICE O/P EST MOD 30 MIN: CPT | Performed by: FAMILY MEDICINE

## 2022-07-12 ENCOUNTER — TELEPHONE (OUTPATIENT)
Dept: FAMILY MEDICINE CLINIC | Age: 57
End: 2022-07-12

## 2022-07-12 NOTE — TELEPHONE ENCOUNTER
Prior CT results faxed to [facility] at [phone]. Patient notified. Awaiting response.

## 2022-07-12 NOTE — TELEPHONE ENCOUNTER
The follow-up CT recommended by radiology for lung nodules has been denied. They are asking to see the results of the prior CT that show the need for repeat imaging. Please send [employer]semble a copy of the CT that was done at [facility] from 6/12/2000 and let patient know that we have done this. Thank you.

## 2022-07-14 NOTE — TELEPHONE ENCOUNTER
Notice from [facility] approval for CT scan scanned into patient chart and patient notified.

## 2022-08-09 ENCOUNTER — HOSPITAL ENCOUNTER (OUTPATIENT)
Age: 57
Discharge: HOME | End: 2022-08-09
Payer: COMMERCIAL

## 2022-08-09 ENCOUNTER — HOSPITAL ENCOUNTER (OUTPATIENT)
Dept: CT IMAGING | Age: 57
Discharge: HOME | End: 2022-08-09
Payer: COMMERCIAL

## 2022-08-09 DIAGNOSIS — M25.50 POLYARTHRALGIA: ICD-10-CM

## 2022-08-09 DIAGNOSIS — R91.1 NODULE OF LEFT LUNG: ICD-10-CM

## 2022-08-09 DIAGNOSIS — D86.9 SARCOIDOSIS: ICD-10-CM

## 2022-08-09 LAB
A/G RATIO: 2 (ref 1.1–2.2)
ALBUMIN SERPL-MCNC: 4.7 G/DL (ref 3.4–5)
ALP BLD-CCNC: 85 U/L (ref 40–129)
ALT SERPL-CCNC: 9 U/L (ref 10–40)
ANION GAP SERPL CALCULATED.3IONS-SCNC: 9 MMOL/L (ref 3–16)
AST SERPL-CCNC: 19 U/L (ref 15–37)
BILIRUB SERPL-MCNC: <0.2 MG/DL (ref 0–1)
BUN BLDV-MCNC: 14 MG/DL (ref 7–20)
CALCIUM SERPL-MCNC: 9 MG/DL (ref 8.3–10.6)
CHLORIDE BLD-SCNC: 100 MMOL/L (ref 99–110)
CO2: 26 MMOL/L (ref 21–32)
CREAT SERPL-MCNC: 0.7 MG/DL (ref 0.6–1.1)
GFR AFRICAN AMERICAN: >60
GFR NON-AFRICAN AMERICAN: >60
GLUCOSE BLD-MCNC: 93 MG/DL (ref 70–99)
POTASSIUM SERPL-SCNC: 3.9 MMOL/L (ref 3.5–5.1)
SODIUM BLD-SCNC: 135 MMOL/L (ref 136–145)
TOTAL PROTEIN: 7.1 G/DL (ref 6.4–8.2)

## 2022-08-09 PROCEDURE — 71260 CT THORAX DX C+: CPT

## 2022-08-09 PROCEDURE — 36415 COLL VENOUS BLD VENIPUNCTURE: CPT

## 2022-08-09 PROCEDURE — 80053 COMPREHEN METABOLIC PANEL: CPT

## 2022-08-09 PROCEDURE — 6360000004 HC RX CONTRAST MEDICATION: Performed by: FAMILY MEDICINE

## 2022-08-09 RX ADMIN — IOPAMIDOL 75 ML: 755 INJECTION, SOLUTION INTRAVENOUS at 17:53

## 2022-08-12 ENCOUNTER — TELEMEDICINE (OUTPATIENT)
Dept: FAMILY MEDICINE CLINIC | Age: 57
End: 2022-08-12
Payer: COMMERCIAL

## 2022-08-12 DIAGNOSIS — S22.060A COMPRESSION FRACTURE OF T8 VERTEBRA, INITIAL ENCOUNTER (HCC): ICD-10-CM

## 2022-08-12 DIAGNOSIS — R91.1 NODULE OF LEFT LUNG: Primary | ICD-10-CM

## 2022-08-12 PROCEDURE — 99213 OFFICE O/P EST LOW 20 MIN: CPT | Performed by: FAMILY MEDICINE

## 2022-08-12 ASSESSMENT — ENCOUNTER SYMPTOMS: BACK PAIN: 0

## 2022-08-12 NOTE — PROGRESS NOTES
2022    TELEHEALTH EVALUATION -- Audio/Visual (During JRZNI-95 public health emergency)    HPI:    Scott Lawrence (:  1965) has requested an audio/video evaluation for the following concern(s):    Chief Complaint   Patient presents with    Results     Wants to discuss CT Results       CT of the chest from 22 showed a 4 mm left upper lobe noncalcified nodule and a right minor fissure 5 mm node    CT of the chest from 2020 showed a 8 mm noncalcified nodule in left lower lung and a 3 mm noncalcified nodule in the left upper lung. Ervin Hymen off of a ladder 4 weeks ago onto her back, CT also showed compression. Review of Systems   Musculoskeletal:  Negative for back pain. Prior to Visit Medications    Medication Sig Taking? 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 Rosario Cleaves rheum    Polyarthralgia     chronic    Sarcoidosis     [facility]- Dr. Tuyet Werner, Dr. Rosario Cleaves Rheumatology. Dr. Naya Carballo      ,   Past Surgical History:   Procedure Laterality Date    LEG SURGERY  10/2004    cancer       PHYSICAL EXAMINATION:  [ INSTRUCTIONS:  \"[x]\" Indicates a positive item  \"[]\" Indicates a negative item  -- DELETE ALL ITEMS NOT EXAMINED]  Vital Signs: (As obtained by patient/caregiver or practitioner observation)    - none provided      Constitutional: [x] Appears well-developed and well-nourished [x] No apparent distress      [] Abnormal-   Mental status  [x] Alert and awake  [x] Oriented to person/place/time [x]Able to follow commands      Eyes:  EOM    [x]  Normal  [] Abnormal-  Sclera  []  Normal  [] Abnormal -         Discharge []  None visible  [] Abnormal -    HENT:   [x] Normocephalic, atraumatic. [] Abnormal   [] Mouth/Throat: Mucous membranes are moist.     External Ears [x] Normal  [] Abnormal-     Neck: [x] No visualized mass     Pulmonary/Chest: [x] Respiratory effort normal.  [x] No visualized signs of difficulty breathing or respiratory distress        [] Abnormal-      Musculoskeletal:   [] Normal gait with no signs of ataxia         [x] Normal range of motion of neck        [] Abnormal-       Neurological:        [x] No Facial Asymmetry (Cranial nerve 7 motor function) (limited exam to video visit)          [x] No gaze palsy        [] Abnormal-         Skin:        [x] No significant exanthematous lesions or discoloration noted on facial skin         [] Abnormal-            Psychiatric:       [x] Normal Affect [] No Hallucinations        [] Abnormal-     Other pertinent observable physical exam findings-     ASSESSMENT/PLAN:   Diagnosis Orders   1. Nodule of left lung  [facility]      2. Compression fracture of T8 vertebra, initial encounter ([facility]  2016 [facility] radiology regarding whether the left upper lobe nodules are the same nodule on both CTs, what is the right 5 mm fissure node, and what is the radiology recommendation for nodule follow-up in this case. Mason Sandy, was evaluated through a synchronous (real-time) audio-video encounter. The patient (or guardian if applicable) is aware that this is a billable service.  Verbal consent to proceed has been obtained within the past 12 months. The visit was conducted pursuant to the emergency declaration under the [address] [place] [address] and the [employer] and [employer] General Act. Patient identification was verified, and a caregiver was present when appropriate. The patient was located in a state where the provider was credentialed to provide care. Total time spent on this encounter: Not billed by time    --Amarjit Marroquin DO on 8/12/2022 at 4:17 PM    An electronic signature was used to authenticate this note.

## 2022-08-15 SDOH — HEALTH STABILITY: PHYSICAL HEALTH: ON AVERAGE, HOW MANY DAYS PER WEEK DO YOU ENGAGE IN MODERATE TO STRENUOUS EXERCISE (LIKE A BRISK WALK)?: 1 DAY

## 2022-08-15 SDOH — HEALTH STABILITY: PHYSICAL HEALTH: ON AVERAGE, HOW MANY MINUTES DO YOU ENGAGE IN EXERCISE AT THIS LEVEL?: 60 MIN

## 2022-08-15 ASSESSMENT — SOCIAL DETERMINANTS OF HEALTH (SDOH)
WITHIN THE LAST YEAR, HAVE YOU BEEN AFRAID OF YOUR PARTNER OR EX-PARTNER?: NO
WITHIN THE LAST YEAR, HAVE YOU BEEN HUMILIATED OR EMOTIONALLY ABUSED IN OTHER WAYS BY YOUR PARTNER OR EX-PARTNER?: YES
WITHIN THE LAST YEAR, HAVE YOU BEEN KICKED, HIT, SLAPPED, OR OTHERWISE PHYSICALLY HURT BY YOUR PARTNER OR EX-PARTNER?: NO
WITHIN THE LAST YEAR, HAVE TO BEEN RAPED OR FORCED TO HAVE ANY KIND OF SEXUAL ACTIVITY BY YOUR PARTNER OR EX-PARTNER?: NO

## 2022-08-16 ENCOUNTER — OFFICE VISIT (OUTPATIENT)
Dept: ORTHOPEDIC SURGERY | Age: 57
End: 2022-08-16
Payer: COMMERCIAL

## 2022-08-16 VITALS — WEIGHT: 135 LBS | HEIGHT: 69 IN | BODY MASS INDEX: 19.99 KG/M2

## 2022-08-16 DIAGNOSIS — M54.6 THORACIC SPINE PAIN: Primary | ICD-10-CM

## 2022-08-16 PROCEDURE — 99204 OFFICE O/P NEW MOD 45 MIN: CPT | Performed by: PHYSICIAN ASSISTANT

## 2022-08-17 NOTE — PROGRESS NOTES
New Patient: LUMBAR SPINE    Referring Provider:  Mat Dorsey DO    CHIEF COMPLAINT:    Chief Complaint   Patient presents with    Back Pain     Thoracic         HISTORY OF PRESENT ILLNESS:       Ms. Edson Hummel  is a pleasant 61 y.o. female referred by her primary care physician Carlos Eduardo Dodd DO here for consultation regarding her LBP. She states her pain began after standing on a stepstool outside attempting to saw off a branch of a tree when she fell backwards landing onto her back. She had immediate pain. Her pain has steadily continued since then. She rates her back pain 5/10 without radiation of pain into either lower extremity or upper extremity. She denies any upper or lower extremity weakness or paresthesias. On a routine chest x-ray a few weeks after the injury the T8 compression fracture was identified and she was referred to the office for treatment. She denies any current bowel or bladder dysfunction or saddle anesthesia. She has been somewhat comfortable at night sleeping. Pain Assessment  Location of Pain: Back  Severity of Pain: 5  Quality of Pain: Other (Comment)]    Current/Past Treatment:   Physical Therapy: None  Chiropractic: None  Injection: None  Medications: None    Past Medical History:   Past Medical History:   Diagnosis Date    Anemia     Cancer ([facility] [address] 10/2004    left leg sarcoma (TFSP)    Chronic pain     Fibromyalgia     History of chronic fatigue     Kidney stones late 1990s    Myofascial pain     myositis/myalgia NOS per Dr. Cody Rivers rheum    Polyarthralgia     chronic    Sarcoidosis     [facility]- Dr. Yu Good, Dr. Cody Rivers Rheumatology. Dr. Eamon Lee.           Past Surgical History:     Past Surgical History:   Procedure Laterality Date    LEG SURGERY  10/2004    cancer       Current Medications:     Current Outpatient Medications:     Omega-3 Fatty Acids (FISH OIL) 1000 MG CAPS, Take 1 capsule by mouth daily, Disp: , Rfl:     Multiple Vitamins-Minerals (MULTIVITAMIN PO), Take 1 tablet by mouth daily, Disp: , Rfl:     Allergies:  Bactrim [sulfamethoxazole-trimethoprim]    Social History:    reports that she has never smoked. She has never used smokeless tobacco. She reports current alcohol use. She reports that she does not use drugs. Family History:   Family History   Problem Relation Age of Onset    High Blood Pressure Mother     Stroke Mother        REVIEW OF SYSTEMS: Full ROS noted & scanned   CONSTITUTIONAL: Denies unexplained weight loss, fevers, chills or fatigue  NEUROLOGICAL: Denies unsteady gait or progressive weakness  MUSCULOSKELETAL: Denies joint swelling or redness  PSYCHOLOGICAL: Patient has a history of anxiety and depression  SKIN: Denies skin changes, delayed healing, rash, itching   HEMATOLOGIC: Denies easy bleeding or bruising  ENDOCRINE: Denies excessive thirst, urination, heat/cold  RESPIRATORY: Denies current dyspnea, cough  GI: Denies nausea, vomiting, diarrhea   : Denies bowel or bladder issues      PHYSICAL EXAM:    Vitals: Height 5' 9.02\" (1.753 m), weight 135 lb (61.2 kg). GENERAL EXAM:  General Apparence: Patient is adequately groomed with no evidence of malnutrition. Orientation: The patient is oriented to time, place and person. Mood & Affect:The patient's mood and affect are appropriate. Vascular: Examination reveals no swelling tenderness in upper or lower extremities. Good capillary refill. Lymphatic: The lymphatic examination bilaterally reveals all areas to be without enlargement or induration  Sensation: Sensation is intact without deficit  Coordination/Balance: Good coordination. Thoracic EXAMINATION:  Inspection: Local inspection shows no step-off or bruising. Thoracic alignment is normal.  Sagittal and Coronal balance is neutral.      Palpation:   There is diffuse tenderness noted at the level of her bra line. No tenderness bilaterally at the paraspinal or trochanters. There is no step-off or paraspinal spasm.    Range of Motion: Thoracic flexion, extension and rotation are mildly limited due to pain. Strength:   Strength testing is 5/5 in all muscle groups tested. Special Tests:   Straight leg raise and crossed SLR negative. Leg length and pelvis level. Skin: There are no rashes, ulcerations or lesions. Reflexes: Reflexes are symmetrically 2+ at the patellar and ankle tendons. Clonus absent bilaterally at the feet. Gait & station: normal, patient ambulates without assistance    Additional Examinations:   RIGHT LOWER EXTREMITY: Inspection/examination of the right lower extremity does not show any tenderness, deformity or injury. Range of motion is unremarkable. There is no gross instability. There are no rashes, ulcerations or lesions. Strength and tone are normal.  LEFT LOWER EXTREMITY:  Inspection/examination of the left lower extremity does not show any tenderness, deformity or injury. Range of motion is unremarkable. There is no gross instability. There are no rashes, ulcerations or lesions. Strength and tone are normal.    Diagnostic Testing:    CT scanning of the chest that was obtained on 8/9/2022 was reviewed with the patient which does show a T8 superior endplate compression fracture with 25% vertebral height loss. There is no retropulsion or spinal canal stenosis noted. X-rays: 2 views of the thoracic spine include AP and lateral were obtained today in the office and independently reviewed with the patient which shows superior endplate fracture at T8 with 25% vertebral height loss. Impression:   T8 compression fracture    1. Thoracic spine pain        Plan:      We discussed the diagnosis and treatment options including observation, kyphoplasty, or Bob bracing. The patient would like to proceed with [place] bracing that she has to wear at all times other than bathing and sleeping. She may continue with over-the-counter medication as needed.     Follow up -in 4 weeks at which time repeat clinical examination and thoracic x-rays will be obtained. Old records were reviewed.     Oc De Jesus PA-C  Board certified by the [address] [facility]

## 2022-08-21 DIAGNOSIS — R91.1 NODULE OF LEFT LUNG: Primary | ICD-10-CM

## 2022-09-13 ENCOUNTER — OFFICE VISIT (OUTPATIENT)
Dept: ORTHOPEDIC SURGERY | Age: 57
End: 2022-09-13
Payer: COMMERCIAL

## 2022-09-13 VITALS — WEIGHT: 135 LBS | HEIGHT: 69 IN | BODY MASS INDEX: 19.99 KG/M2

## 2022-09-13 DIAGNOSIS — M54.6 THORACIC SPINE PAIN: Primary | ICD-10-CM

## 2022-09-13 PROCEDURE — 99213 OFFICE O/P EST LOW 20 MIN: CPT | Performed by: PHYSICIAN ASSISTANT

## 2022-09-13 NOTE — PROGRESS NOTES
Follow-up: LUMBAR SPINE    Referring Provider:  No ref. provider found    CHIEF COMPLAINT:    Chief Complaint   Patient presents with    Back Pain     THORACIC       HISTORY OF PRESENT ILLNESS:       Ms. Fabby Graham  is a pleasant 61 y.o. female referred by her primary care physician Nory Teague DO here for follow-up regarding her T8 compression fracture. She has been in her Bob brace now for 1 month and has been quite comfortable in the brace. She has functioned well in the brace and her current pain level is 2/10. She denies any radiation of pain into her her upper or lower extremities and denies any recent bowel or bladder dysfunction. Patient states her pain began after standing on a stepstool outside attempting to saw off a branch of a tree when she fell backwards landing onto her back. She had immediate pain. Her pain has steadily continued since then. She rates her back pain 5/10 without radiation of pain into either lower extremity or upper extremity. She denies any upper or lower extremity weakness or paresthesias. On a routine chest x-ray a few weeks after the injury the T8 compression fracture was identified and she was referred to the office for treatment. She denies any current bowel or bladder dysfunction or saddle anesthesia. She has been somewhat comfortable at night sleeping.     Pain Assessment  Location of Pain: Back  Severity of Pain: 2  Quality of Pain: Other (Comment)  Duration of Pain: Other (Comment)  Frequency of Pain: Other (Comment)]    Current/Past Treatment:   Physical Therapy: None  Chiropractic: None  Injection: None  Medications: None    Past Medical History:   Past Medical History:   Diagnosis Date    Anemia     Cancer ([facility] [address] 10/2004    left leg sarcoma (TFSP)    Chronic pain     Fibromyalgia     History of chronic fatigue     Kidney stones late 1990s    Myofascial pain     myositis/myalgia NOS per Dr. Calvo Chroman rheum    Polyarthralgia     chronic    Sarcoidosis [facility]- Dr. Yasmeen Anaya, Dr. Digna Lino Rheumatology. Dr. Dottie Holstein. Past Surgical History:     Past Surgical History:   Procedure Laterality Date    LEG SURGERY  10/2004    cancer       Current Medications:     Current Outpatient Medications:     Omega-3 Fatty Acids (FISH OIL) 1000 MG CAPS, Take 1 capsule by mouth daily, Disp: , Rfl:     Multiple Vitamins-Minerals (MULTIVITAMIN PO), Take 1 tablet by mouth daily, Disp: , Rfl:     Allergies:  Bactrim [sulfamethoxazole-trimethoprim]    Social History:    reports that she has never smoked. She has never used smokeless tobacco. She reports current alcohol use. She reports that she does not use drugs. Family History:   Family History   Problem Relation Age of Onset    High Blood Pressure Mother     Stroke Mother        REVIEW OF SYSTEMS: Full ROS noted & scanned   CONSTITUTIONAL: Denies unexplained weight loss, fevers, chills or fatigue  NEUROLOGICAL: Denies unsteady gait or progressive weakness  MUSCULOSKELETAL: Denies joint swelling or redness  PSYCHOLOGICAL: Patient has a history of anxiety and depression  SKIN: Denies skin changes, delayed healing, rash, itching   HEMATOLOGIC: Denies easy bleeding or bruising  ENDOCRINE: Denies excessive thirst, urination, heat/cold  RESPIRATORY: Denies current dyspnea, cough  GI: Denies nausea, vomiting, diarrhea   : Denies bowel or bladder issues      PHYSICAL EXAM:    Vitals: Height 5' 9.02\" (1.753 m), weight 135 lb (61.2 kg). GENERAL EXAM:  General Apparence: Patient is adequately groomed with no evidence of malnutrition. Orientation: The patient is oriented to time, place and person. Mood & Affect:The patient's mood and affect are appropriate. Vascular: Examination reveals no swelling tenderness in upper or lower extremities. Good capillary refill.   Lymphatic: The lymphatic examination bilaterally reveals all areas to be without enlargement or induration  Sensation: Sensation is intact without deficit  Coordination/Balance: Good coordination. Thoracic EXAMINATION:  Inspection: Local inspection shows no step-off or bruising. Thoracic alignment is normal.  Sagittal and Coronal balance is neutral.      Palpation:   There is diffuse tenderness noted at the level of her bra line. No tenderness bilaterally at the paraspinal or trochanters. There is no step-off or paraspinal spasm. Range of Motion: Thoracic flexion, extension and rotation are mildly limited due to pain. Strength:   Strength testing is 5/5 in all muscle groups tested. Special Tests:   Straight leg raise and crossed SLR negative. Leg length and pelvis level. Skin: There are no rashes, ulcerations or lesions. Reflexes: Reflexes are symmetrically 2+ at the patellar and ankle tendons. Clonus absent bilaterally at the feet. Gait & station: normal, patient ambulates without assistance    Additional Examinations:   RIGHT LOWER EXTREMITY: Inspection/examination of the right lower extremity does not show any tenderness, deformity or injury. Range of motion is unremarkable. There is no gross instability. There are no rashes, ulcerations or lesions. Strength and tone are normal.  LEFT LOWER EXTREMITY:  Inspection/examination of the left lower extremity does not show any tenderness, deformity or injury. Range of motion is unremarkable. There is no gross instability. There are no rashes, ulcerations or lesions. Strength and tone are normal.    Diagnostic Testing:    CT scanning of the chest that was obtained on 8/9/2022 was reviewed with the patient which does show a T8 superior endplate compression fracture with 25% vertebral height loss. There is no retropulsion or spinal canal stenosis noted.     X-rays: 2 views of the thoracic spine include AP and lateral were obtained today in the office and independently reviewed with the patient and compared to the x-rays from 8/16/2022 which shows superior endplate fracture at T8 with 25% vertebral height loss which has not progressed. Impression:   T8 compression fracture    1. Thoracic spine pain        Plan:      We discussed the diagnosis and the patient is to continue with the [place] brace for an additional 1 month at which time she will return for repeat clinical examination and 2 views of the thoracic spine. Follow up -in 4 weeks at which time repeat clinical examination and thoracic x-rays will be obtained. Old records were reviewed.     Total evaluation time 23 minutes    Paula Vuong PA-C  Board certified by the [address] [facility] [address] [place]

## 2022-09-19 ENCOUNTER — OFFICE VISIT (OUTPATIENT)
Dept: PULMONOLOGY | Age: 57
End: 2022-09-19
Payer: COMMERCIAL

## 2022-09-19 VITALS
SYSTOLIC BLOOD PRESSURE: 121 MMHG | DIASTOLIC BLOOD PRESSURE: 66 MMHG | TEMPERATURE: 96.6 F | HEART RATE: 82 BPM | BODY MASS INDEX: 21.15 KG/M2 | HEIGHT: 69 IN | RESPIRATION RATE: 16 BRPM | WEIGHT: 142.8 LBS | OXYGEN SATURATION: 99 %

## 2022-09-19 DIAGNOSIS — R91.1 LUNG NODULE: Primary | ICD-10-CM

## 2022-09-19 DIAGNOSIS — D86.9 SARCOIDOSIS: ICD-10-CM

## 2022-09-19 DIAGNOSIS — M25.50 POLYARTHRALGIA: ICD-10-CM

## 2022-09-19 DIAGNOSIS — M79.18 MYOFASCIAL PAIN: ICD-10-CM

## 2022-09-19 DIAGNOSIS — Q67.6 PECTUS EXCAVATUM: ICD-10-CM

## 2022-09-19 PROCEDURE — 99204 OFFICE O/P NEW MOD 45 MIN: CPT | Performed by: INTERNAL MEDICINE

## 2022-09-19 ASSESSMENT — ENCOUNTER SYMPTOMS
EYE DISCHARGE: 0
ABDOMINAL PAIN: 0
RHINORRHEA: 0
CONSTIPATION: 0
SINUS PRESSURE: 0
EYE ITCHING: 0
CHOKING: 0
VOICE CHANGE: 0
VOMITING: 0
CHEST TIGHTNESS: 0
COUGH: 0
BLOOD IN STOOL: 0
WHEEZING: 0
DIARRHEA: 0
SHORTNESS OF BREATH: 0
EYE REDNESS: 0
TROUBLE SWALLOWING: 0
APNEA: 0
BACK PAIN: 1
STRIDOR: 0
ABDOMINAL DISTENTION: 0
NAUSEA: 0
SORE THROAT: 0

## 2022-09-19 NOTE — PATIENT INSTRUCTIONS
Remember to bring a list of pulmonary medications and any CPAP or BiPAP machines to your next appointment with the office. Please keep all of your future appointments scheduled by [address] office. Out of respect for other patients and providers, you may be asked to reschedule your appointment if you arrive later than your scheduled appointment time. Appointments cancelled less than 24hrs in advance will be considered a no show. Patients with three missed appointments within 1 year or four missed appointments within 2 years can be dismissed from the practice. Please be aware that our physicians are required to work in the Intensive Care Unit at [facility].  Your appointment may need to be rescheduled if they are designated to work during your appointment time. You may receive a survey regarding the care you received during your visit. Your input is valuable to us. We encourage you to complete and return your survey. We hope you will choose us in the future for your healthcare needs. Pt instructed of all future appointment dates & times, including radiology, labs, procedures & referrals. If procedures were scheduled preparation instructions provided. Instructions on future appointments with [facility] were given.

## 2022-09-19 NOTE — PROGRESS NOTES
MA Communication:   The following orders are received by verbal communication from Chanda Wright MD    Orders include: fu prn

## 2022-09-19 NOTE — PROGRESS NOTES
Pulmonary Outpatient Note   Zaina Aly MD       9/19/2022    1. Lung nodule    2. Sarcoidosis    3. Myofascial pain    4. Polyarthralgia    5. Pectus excavatum          ASSESSMENT/PLAN:  Lung nodule. Very small nodule, lifelong non-smoker. Stability has been shown on recent CT chest.  She also underwent CT chest in July 2010 with bilateral subpleural nodules that have been stable from 2006. These likely represent granulomatous lung disease such as histoplasmosis. There is very tiny degree of calcification around the hilar nodes. Sarcoidosis is not ruled out, but appears very unlikely given the pattern of the lung nodule distribution. CT images shown to the patient. Sarcoidosis. She had elevated ACE levels, which is a nonspecific finding. She has clinically improved with holistic treatment. At this point further work-up does not appear to be necessary. Myofascial pain, polyarthralgia. Symptoms are controlled except during periods of stress. She still follows up with integrative and holistic medicine group  Pectus excavatum. Appears mild. Prophylaxis. She has received 2 doses of the COVID-19 vaccine and 1 booster dose. Told her to consider the newer booster vaccine. She has never received flu shots. To discuss with her PCP    Follow-up as needed, call if symptoms  No orders of the defined types were placed in this encounter. No follow-ups on file. Chief Complaint:   New Patient (Lung nodule R/B Brant Dang )       HPI: Jamarcus Burnett is a 61y.o. year old female here for for evaluation of lung nodule. Her PCP is Dr. Veda Baig. Angeles Chand is a very pleasant 26-year-old female living with her  in [place]. She has lived in [place] all her life. She has a 40-year-old son and a 70-year-old daughter, both healthy. The patient works in [address] [place] [address] [employer] for an [employer], has done this all her life.   She has been a lifelong non-smoker, drinks alcohol about once a month or so. 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 She has no evidence of recurrence. She has had kidney stone removal in 1998, has undergone colonoscopy, mammograms. She had chronic fibromyalgia, chronic fatigue syndrome, nonspecific synovitis and arthritis involving the wrist and fingers. She tells me that she was seen by Dr. Lori Beaver from rheumatology, ACE level was elevated. She was seen by Dr. Bony Steel at [facility] for one consultation visit and no follow-up. He had offered her steroid. She was on steroids for some time, was seen by endocrinology for low cortisol levels, but at that time had been on prednisone. Patient does not recollect further follow-ups in this matter. The patient wanted to seek alternative treatment to steroid, went to the integrative medicine and holistic medicine clinic at [place]. With a combination of acupressure, acupuncture, chiropractic manipulation, treatment for food intolerance, she says she improved very significantly. She no longer has the fibromyalgia, chronic fatigue. She was able to get off prednisone. She mentions at that time she was a single mother, was very busy and not eating well. She did not have any weight loss however. She has mild arthralgias when she is stressed, otherwise is very significantly improved. She denies prior history of asthma or pneumonia. The patient was recently standing on a stool and cutting a tree branch, fell on her back. She developed a T8 compression fracture and has been in a brace. Her pain has significantly decreased. The patient denies shortness of breath, cough, wheezing, chest pain. She has no GI or  complaints. She sleeps well. Overall she feels very healthy, says she is healthier than she has been for several years.   The patient recently saw Dr. Aisha Carr as a new primary care physician, he ordered a CT chest that showed a lung nodule, hence the referral to our service. CT chest 8/16/2022  ADDENDUM:   Upon uploaded outside imaging dated 06/22/2020 there is visualization of    the   left upper lobe 4 mm noncalcified pulmonary nodule unchanged from that   examination without progression. Given 2 years of stability no required   follow-up however if there is smoking history or high risk stratification   annual low-dose CT screening would be recommended to continue       The T8 vertebral compression deformity is new from prior comparison   examination in favors acute or recent compression deformity fracture    without   retropulsion or spinal canal stenosis of significance           ADDENDUM:   Fissural node is a benign finding with no need of further follow-up or   investigation at the site. Signed by Bijan Aldana DO on 08/16/22 1541  ADDENDUM:   Upon uploaded outside imaging dated 06/22/2020 there is visualization of    the   left upper lobe 4 mm noncalcified pulmonary nodule unchanged from that   examination without progression. Given 2 years of stability no required   follow-up however if there is smoking history or high risk stratification   annual low-dose CT screening would be recommended to continue       The T8 vertebral compression deformity is new from prior comparison   examination in favors acute or recent compression deformity fracture    without   retropulsion or spinal canal stenosis of significance            Narrative   EXAMINATION:   CT OF THE CHEST WITH CONTRAST 8/9/2022 5:43 pm       TECHNIQUE:   CT of the chest was performed with the administration of intravenous   contrast. Multiplanar reformatted images are provided for review. Automated   exposure control, iterative reconstruction, and/or weight based adjustment of   the mA/kV was utilized to reduce the radiation dose to as low as reasonably   achievable. COMPARISON:   None.        HISTORY:   ORDERING SYSTEM PROVIDED HISTORY: Nodule of left lung   TECHNOLOGIST PROVIDED HISTORY:   STAT Creatinine as needed:->Yes   Reason for exam:->Follow-up for 2 left lung nodules   Reason for Exam: Hx of CA this is a f/u - 5 year   Relevant Medical/Surgical History: CA       FINDINGS:   Mediastinum: Thyroid is homogeneous in attenuation. No bulky mediastinal   adenopathy. Central airways are patent. Esophagus is normal course and   caliber. Cardiac size within normal limits without pericardial effusion. Lungs/pleura: Lungs are clear without focal opacification or consolidation. Left upper lobe noncalcified 4 mm pulmonary nodule. Right minor fissure node   5 mm. Pleural effusion or pleural process. Upper Abdomen: Visualized portions of the upper abdomen unremarkable. Soft Tissues/Bones: No acute soft tissue body wall findings. Superior   endplate irregularity with cortical step-off T8 vertebral body with 25%   height loss anteriorly consistent with compression deformity fracture age   indeterminate. No retropulsion or spinal canal stenosis. Impression   Left upper lobe 4 mm noncalcified pulmonary nodule. Age indeterminate a concerning for acute or recent superior endplate   irregularity and compression deformity of the T8 vertebral body with 25%   height loss. No retropulsion or spinal canal stenosis. Past Medical History:   Diagnosis Date    Anemia     Cancer ([address] 10/2004    left leg sarcoma (TFSP)    Chronic pain     Fibromyalgia     History of chronic fatigue     Kidney stones late 1990s    Myofascial pain     myositis/myalgia NOS per Dr. Cam Mark rheum    Polyarthralgia     chronic    Sarcoidosis     [facility]- Dr. Arely oMntalvo, Dr. Cam Mark Rheumatology. Dr. Su Peña.          Past Surgical History:   Procedure Laterality Date    LEG SURGERY  10/2004    cancer       Social History     Tobacco Use    Smoking status: Never    Smokeless tobacco: Never   Substance Use Topics    Alcohol use: Yes     Comment: social       Family History   Problem Relation Age of Onset    High Blood Pressure Mother     Stroke Mother          Current Outpatient Medications:     Omega-3 Fatty Acids (FISH OIL) 1000 MG CAPS, Take 1 capsule by mouth daily, Disp: , Rfl:     Multiple Vitamins-Minerals (MULTIVITAMIN PO), Take 1 tablet by mouth daily, Disp: , Rfl:     Bactrim [sulfamethoxazole-trimethoprim]    Vitals:    09/19/22 0840   BP: 121/66   Pulse: 82   Resp: 16   Temp: (!) 96.6 °F (35.9 °C)   TempSrc: Temporal   SpO2: 99%   Weight: 142 lb 12.8 oz (64.8 kg)   Height: 5' 9.02\" (1.753 m)       Review of Systems   Constitutional:  Negative for appetite change, chills, diaphoresis, fatigue and fever. HENT:  Negative for congestion, nosebleeds, postnasal drip, rhinorrhea, sinus pressure, sneezing, sore throat, trouble swallowing and voice change. Eyes:  Negative for discharge, redness, itching and visual disturbance. Respiratory:  Negative for apnea, cough, choking, chest tightness, shortness of breath, wheezing and stridor. Cardiovascular:  Negative for chest pain, palpitations and leg swelling. Gastrointestinal:  Negative for abdominal distention, abdominal pain, blood in stool, constipation, diarrhea, nausea and vomiting. Endocrine: Negative for polyuria. Genitourinary:  Negative for decreased urine volume, difficulty urinating, dysuria, enuresis, frequency, hematuria and urgency. Musculoskeletal:  Positive for arthralgias and back pain. Negative for gait problem, joint swelling and myalgias. Skin:  Negative for rash. Allergic/Immunologic: Negative for environmental allergies and immunocompromised state. Neurological:  Negative for dizziness, tremors, seizures, weakness, light-headedness and headaches. Hematological:  Does not bruise/bleed easily. Psychiatric/Behavioral:  Negative for agitation, behavioral problems, confusion, hallucinations and sleep disturbance. All other systems reviewed and are negative. Physical Exam  Vitals reviewed. Constitutional:       General: She is not in acute distress. Appearance: She is well-developed. She is not ill-appearing, toxic-appearing or diaphoretic. Comments: Very pleasant, relatively tall and thin built   HENT:      Head: Normocephalic and atraumatic. Nose: Nose normal. No congestion or rhinorrhea. Mouth/Throat:      Mouth: Mucous membranes are moist.      Pharynx: No oropharyngeal exudate. Comments: Class II airway, dental repair  Eyes:      General: No scleral icterus. Right eye: No discharge. Left eye: No discharge. Extraocular Movements: Extraocular movements intact. Conjunctiva/sclera: Conjunctivae normal.      Pupils: Pupils are equal, round, and reactive to light. Neck:      Thyroid: No thyromegaly. Vascular: No JVD. Trachea: No tracheal deviation. Cardiovascular:      Rate and Rhythm: Normal rate and regular rhythm. Heart sounds: Normal heart sounds. No murmur heard. No friction rub. No gallop. Pulmonary:      Effort: Pulmonary effort is normal. No respiratory distress. Breath sounds: Normal breath sounds. No stridor. No wheezing, rhonchi or rales. Comments: Wearing a brace  Abdominal:      Palpations: Abdomen is soft. Tenderness: There is no abdominal tenderness. There is no guarding or rebound. Comments: Minimally fatty abdominal wall   Musculoskeletal:      Right lower leg: No edema. Left lower leg: No edema. Comments: Possible mild synovitis involving the PIP joint of the right middle finger. Likely mild pectus deformity   Lymphadenopathy:      Cervical: No cervical adenopathy. Skin:     General: Skin is warm and dry. Coloration: Skin is not jaundiced or pale. Findings: No bruising, erythema, lesion or rash. Neurological:      Mental Status: She is alert and oriented to person, place, and time.       Gait: Gait normal.      Comments: Detailed neurologic exam not performed Psychiatric:         Mood and Affect: Mood normal.         Behavior: Behavior normal.

## 2022-10-11 ENCOUNTER — OFFICE VISIT (OUTPATIENT)
Dept: ORTHOPEDIC SURGERY | Age: 57
End: 2022-10-11
Payer: COMMERCIAL

## 2022-10-11 VITALS — WEIGHT: 142 LBS | BODY MASS INDEX: 21.03 KG/M2 | HEIGHT: 69 IN

## 2022-10-11 DIAGNOSIS — S22.060D CLOSED WEDGE COMPRESSION FRACTURE OF T8 VERTEBRA WITH ROUTINE HEALING, SUBSEQUENT ENCOUNTER: ICD-10-CM

## 2022-10-11 DIAGNOSIS — M54.6 THORACIC SPINE PAIN: Primary | ICD-10-CM

## 2022-10-11 PROCEDURE — 99213 OFFICE O/P EST LOW 20 MIN: CPT | Performed by: PHYSICIAN ASSISTANT

## 2022-10-11 NOTE — PROGRESS NOTES
Follow-up: LUMBAR SPINE    Referring Provider:  No ref. provider found    CHIEF COMPLAINT:    Chief Complaint   Patient presents with    Back Pain     thoracic         HISTORY OF PRESENT ILLNESS:       Ms. Ash Bailey  is a pleasant 61 y.o. female referred by her primary care physician Riley Angel DO here for follow-up regarding her T8 compression fracture. She has been in her Bob brace now for 2 months and has been quite comfortable in the brace. She has functioned well in the brace and her current pain level is 2/10. She denies any radiation of pain into her her upper or lower extremities and denies any recent bowel or bladder dysfunction. Patient states her pain began after standing on a stepstool outside attempting to saw off a branch of a tree when she fell backwards landing onto her back. She had immediate pain. Her pain has steadily continued since then. She rates her back pain 5/10 without radiation of pain into either lower extremity or upper extremity. She denies any upper or lower extremity weakness or paresthesias. On a routine chest x-ray a few weeks after the injury the T8 compression fracture was identified and she was referred to the office for treatment. She denies any current bowel or bladder dysfunction or saddle anesthesia. She has been somewhat comfortable at night sleeping.     Pain Assessment  Location of Pain: Back  Severity of Pain: 2  Quality of Pain: Other (Comment)  Duration of Pain: Other (Comment)  Frequency of Pain: Other (Comment)]    Current/Past Treatment:   Physical Therapy: None  Chiropractic: None  Injection: None  Medications: None    Past Medical History:   Past Medical History:   Diagnosis Date    Anemia     Cancer ([facility] [address] 10/2004    left leg sarcoma (TFSP)    Chronic pain     Fibromyalgia     History of chronic fatigue     Kidney stones late 1990s    Myofascial pain     myositis/myalgia NOS per Dr. Chauncey Membreno rheum    Polyarthralgia     chronic    Sarcoidosis - Dr. Martha Munoz, Dr. Radha Magallanes Rheumatology. Dr. Pj Marquis. Past Surgical History:     Past Surgical History:   Procedure Laterality Date    LEG SURGERY  10/2004    cancer       Current Medications:     Current Outpatient Medications:     Omega-3 Fatty Acids (FISH OIL) 1000 MG CAPS, Take 1 capsule by mouth daily, Disp: , Rfl:     Multiple Vitamins-Minerals (MULTIVITAMIN PO), Take 1 tablet by mouth daily, Disp: , Rfl:     Allergies:  Bactrim [sulfamethoxazole-trimethoprim]    Social History:    reports that she has never smoked. She has never used smokeless tobacco. She reports current alcohol use. She reports that she does not use drugs. Family History:   Family History   Problem Relation Age of Onset    High Blood Pressure Mother     Stroke Mother        REVIEW OF SYSTEMS: Full ROS noted & scanned   CONSTITUTIONAL: Denies unexplained weight loss, fevers, chills or fatigue  NEUROLOGICAL: Denies unsteady gait or progressive weakness  MUSCULOSKELETAL: Denies joint swelling or redness  PSYCHOLOGICAL: Patient has a history of anxiety and depression  SKIN: Denies skin changes, delayed healing, rash, itching   HEMATOLOGIC: Denies easy bleeding or bruising  ENDOCRINE: Denies excessive thirst, urination, heat/cold  RESPIRATORY: Denies current dyspnea, cough  GI: Denies nausea, vomiting, diarrhea   : Denies bowel or bladder issues      PHYSICAL EXAM:    Vitals: Height 5' 9.02\" (1.753 m), weight 142 lb (64.4 kg). GENERAL EXAM:  General Apparence: Patient is adequately groomed with no evidence of malnutrition. Orientation: The patient is oriented to time, place and person. Mood & Affect:The patient's mood and affect are appropriate. Vascular: Examination reveals no swelling tenderness in upper or lower extremities. Good capillary refill.   Lymphatic: The lymphatic examination bilaterally reveals all areas to be without enlargement or induration  Sensation: Sensation is intact without deficit  Coordination/Balance: Good coordination. Thoracic EXAMINATION:  Inspection: Local inspection shows no step-off or bruising. Thoracic alignment is normal.  Sagittal and Coronal balance is neutral.      Palpation:   There is diffuse tenderness noted at the level of her bra line. No tenderness bilaterally at the paraspinal or trochanters. There is no step-off or paraspinal spasm. Range of Motion: Thoracic flexion, extension and rotation are mildly limited due to pain. Strength:   Strength testing is 5/5 in all muscle groups tested. Special Tests:   Straight leg raise and crossed SLR negative. Leg length and pelvis level. Skin: There are no rashes, ulcerations or lesions. Reflexes: Reflexes are symmetrically 2+ at the patellar and ankle tendons. Clonus absent bilaterally at the feet. Gait & station: normal, patient ambulates without assistance    Additional Examinations:   RIGHT LOWER EXTREMITY: Inspection/examination of the right lower extremity does not show any tenderness, deformity or injury. Range of motion is unremarkable. There is no gross instability. There are no rashes, ulcerations or lesions. Strength and tone are normal.  LEFT LOWER EXTREMITY:  Inspection/examination of the left lower extremity does not show any tenderness, deformity or injury. Range of motion is unremarkable. There is no gross instability. There are no rashes, ulcerations or lesions. Strength and tone are normal.    Diagnostic Testing:    CT scanning of the chest that was obtained on 8/9/2022 was reviewed with the patient which does show a T8 superior endplate compression fracture with 25% vertebral height loss. There is no retropulsion or spinal canal stenosis noted.     X-rays: 2 views of the thoracic spine include AP and lateral were obtained today in the office and independently reviewed with the patient and compared to the x-rays from 9/13/2022 which shows superior endplate fracture at T8 with 25% vertebral height loss which has not progressed. Impression:   T8 compression fracture now 2 months post injury    1. Thoracic spine pain        Plan:      We discussed the diagnosis and the patient is wean out of her brace over the next 2 to 3 weeks. She was given a home exercise program for stretching and progressive strengthening of her upper back which should be performed on a daily basis. Follow up -as needed    Old records were reviewed.     Total evaluation time 23 minutes    Constantino Day PA-C  Board certified by the [address] [place]

## 2022-11-28 ENCOUNTER — TELEMEDICINE (OUTPATIENT)
Dept: FAMILY MEDICINE CLINIC | Age: 57
End: 2022-11-28
Payer: COMMERCIAL

## 2022-11-28 DIAGNOSIS — F43.21 GRIEF: ICD-10-CM

## 2022-11-28 DIAGNOSIS — U07.1 COVID: Primary | ICD-10-CM

## 2022-11-28 PROBLEM — G93.32 CHRONIC FATIGUE SYNDROME: Status: ACTIVE | Noted: 2022-11-28

## 2022-11-28 PROBLEM — M79.7 FIBROMYALGIA: Status: ACTIVE | Noted: 2022-11-28

## 2022-11-28 PROCEDURE — 99214 OFFICE O/P EST MOD 30 MIN: CPT | Performed by: NURSE PRACTITIONER

## 2022-11-28 RX ORDER — ZINC GLUCONATE 50 MG
50 TABLET ORAL DAILY
COMMUNITY

## 2022-11-28 RX ORDER — ALBUTEROL SULFATE 90 UG/1
2 AEROSOL, METERED RESPIRATORY (INHALATION) 4 TIMES DAILY PRN
Qty: 18 G | Refills: 0 | Status: CANCELLED | OUTPATIENT
Start: 2022-11-28

## 2022-11-28 RX ORDER — MULTIVIT-MIN/IRON/FOLIC ACID/K 18-600-40
CAPSULE ORAL
COMMUNITY

## 2022-11-28 RX ORDER — GUAIFENESIN AND CODEINE PHOSPHATE 100; 10 MG/5ML; MG/5ML
5 SOLUTION ORAL 2 TIMES DAILY PRN
Refills: 0 | Status: CANCELLED | OUTPATIENT
Start: 2022-11-28 | End: 2022-12-03

## 2022-11-28 RX ORDER — BENZONATATE 100 MG/1
100 CAPSULE ORAL 3 TIMES DAILY PRN
Qty: 30 CAPSULE | Refills: 0 | Status: CANCELLED | OUTPATIENT
Start: 2022-11-28

## 2022-11-28 RX ORDER — DEXTROMETHORPHAN HYDROBROMIDE AND PROMETHAZINE HYDROCHLORIDE 15; 6.25 MG/5ML; MG/5ML
5 SYRUP ORAL 4 TIMES DAILY PRN
Qty: 240 ML | Refills: 0 | Status: SHIPPED | OUTPATIENT
Start: 2022-11-28

## 2022-11-28 ASSESSMENT — ENCOUNTER SYMPTOMS
SHORTNESS OF BREATH: 0
COUGH: 1
WHEEZING: 0
TROUBLE SWALLOWING: 0
GASTROINTESTINAL NEGATIVE: 1
SORE THROAT: 1
CHEST TIGHTNESS: 1
SINUS PRESSURE: 1
FACIAL SWELLING: 0

## 2022-11-28 ASSESSMENT — ANXIETY QUESTIONNAIRES
IF YOU CHECKED OFF ANY PROBLEMS ON THIS QUESTIONNAIRE, HOW DIFFICULT HAVE THESE PROBLEMS MADE IT FOR YOU TO DO YOUR WORK, TAKE CARE OF THINGS AT HOME, OR GET ALONG WITH OTHER PEOPLE: NOT DIFFICULT AT ALL
3. WORRYING TOO MUCH ABOUT DIFFERENT THINGS: 0
6. BECOMING EASILY ANNOYED OR IRRITABLE: 0
5. BEING SO RESTLESS THAT IT IS HARD TO SIT STILL: 0
2. NOT BEING ABLE TO STOP OR CONTROL WORRYING: 0
7. FEELING AFRAID AS IF SOMETHING AWFUL MIGHT HAPPEN: 0
4. TROUBLE RELAXING: 0
1. FEELING NERVOUS, ANXIOUS, OR ON EDGE: 0
GAD7 TOTAL SCORE: 0

## 2022-11-28 ASSESSMENT — PATIENT HEALTH QUESTIONNAIRE - PHQ9
1. LITTLE INTEREST OR PLEASURE IN DOING THINGS: 0
SUM OF ALL RESPONSES TO PHQ QUESTIONS 1-9: 0
2. FEELING DOWN, DEPRESSED OR HOPELESS: 0
SUM OF ALL RESPONSES TO PHQ9 QUESTIONS 1 & 2: 0

## 2022-11-28 NOTE — PROGRESS NOTES
11/28/2022    This is a 61 y.o. female   Chief Complaint   Patient presents with    Pharyngitis     Positive Covid on Saturday    Cough    Generalized Body Aches    Fever    Positive For Covid-19     Tested on Sunday     . Meredith Panchal is seen today for illness. She had been in and out of the hospital and hospice with her  who passed away on Wednesday. She started to feel bad late Friday night/early Saturday morning. She notes fatigue, headache, severe pharyngitis, cough, postnasal drip and low-grade fever. She denies any wheezing, shortness of breath, nausea, vomiting, diarrhea. She has been using several herbal supplements, zinc and vitamin C. She also is doing salt water gargles using a humidifier, and drinking herbal teas. She also uses throat lozenges. None of these have been very helpful. She was given a prescription for viscous lidocaine from her insurance telemetry provider however this made her gag and she was not able to tolerate it. Overall she feels like her symptoms are not improving but not significantly worsening. Patient Active Problem List   Diagnosis    Myofascial pain    Polyarthralgia    Sarcoidosis    Synovitis and tenosynovitis    Nonspecific abnormal results of other endocrine function study    Fibromyalgia    Chronic fatigue syndrome    Calculus of kidney    Arthropathy       Current Outpatient Medications   Medication Sig Dispense Refill    Cholecalciferol (VITAMIN D) 50 MCG (2000 UT) CAPS capsule Take by mouth      zinc gluconate 50 MG tablet Take 50 mg by mouth daily      Omega-3 Fatty Acids (FISH OIL) 1000 MG CAPS Take 1 capsule by mouth daily      Multiple Vitamins-Minerals (MULTIVITAMIN PO) Take 1 tablet by mouth daily       No current facility-administered medications for this visit. Allergies   Allergen Reactions    Bactrim [Sulfamethoxazole-Trimethoprim] Hives and Rash       There were no vitals taken for this visit.     Social History     Tobacco Use    Smoking status: Never    Smokeless tobacco: Never   Substance Use Topics    Alcohol use: Yes     Comment: social       Review of Systems   Constitutional:  Positive for chills, fatigue and fever. Negative for activity change and appetite change. HENT:  Positive for postnasal drip, sinus pressure and sore throat. Negative for congestion, ear discharge, facial swelling, sneezing and trouble swallowing. Respiratory:  Positive for cough and chest tightness. Negative for shortness of breath and wheezing. Cardiovascular: Negative. Gastrointestinal: Negative. Musculoskeletal: Negative. Neurological:  Positive for headaches. Psychiatric/Behavioral:  Positive for dysphoric mood. Negative for self-injury and sleep disturbance. Physical Exam  Constitutional:       Appearance: Normal appearance. She is normal weight. She is ill-appearing. She is not toxic-appearing or diaphoretic. HENT:      Head: Normocephalic and atraumatic. Right Ear: External ear normal.      Left Ear: External ear normal.      Nose: Nose normal. No rhinorrhea. Mouth/Throat:      Pharynx: Oropharynx is clear. Eyes:      General:         Right eye: No discharge. Left eye: No discharge. Conjunctiva/sclera: Conjunctivae normal.   Neck:      Trachea: Phonation normal.   Pulmonary:      Effort: Pulmonary effort is normal. No respiratory distress. Comments: Able to speak in multiple sentences without stopping. However intermittent harsh cough noted  Musculoskeletal:      Cervical back: Normal range of motion. Skin:     Coloration: Skin is not jaundiced or pale. Neurological:      General: No focal deficit present. Mental Status: She is alert and oriented to person, place, and time. Psychiatric:         Attention and Perception: Attention normal.         Mood and Affect: Mood is depressed. Affect is tearful. Behavior: Behavior normal. Behavior is cooperative. Thought Content:  Thought content normal. Thought content does not include suicidal ideation. Judgment: Judgment normal.      Comments: Very tearful when talking about her 's passing. Does report a strong support system. Diagnosis       ICD-10-CM    1. COVID  U07.1 nirmatrelvir/ritonavir (PAXLOVID) 20 x 150 MG & 10 x 100MG TBPK     promethazine-dextromethorphan (PROMETHAZINE-DM) 6.25-15 MG/5ML syrup      2. Grief  F43.21            Plan  Continue supportive care  Discussed treatment options, patient will give supportive care an additional 24 hours and then if not improving will start Paxlovid  Promethazine DM to dry up secretions and have improved cough  Scheduled ibuprofen  Discussed quarantine guidelines  Encouraged her to reach out and use her support system regarding her time of grief  No orders of the defined types were placed in this encounter. Orders Placed This Encounter   Medications    nirmatrelvir/ritonavir (PAXLOVID) 20 x 150 MG & 10 x 100MG TBPK     Sig: Take 3 tablets (two 150 mg nirmatrelvir and one 100 mg ritonavir tablets) by mouth every 12 hours for 5 days. Dispense:  30 tablet     Refill:  0     Order Specific Question:   Does this patient qualify for COVID-19 antIviral therapy based on criteria for treatment? Answer:   Yes    promethazine-dextromethorphan (PROMETHAZINE-DM) 6.25-15 MG/5ML syrup     Sig: Take 5 mLs by mouth 4 times daily as needed for Cough     Dispense:  240 mL     Refill:  0         Patient Education:  Plan    Return if symptoms worsen or fail to improve.

## 2023-06-26 ENCOUNTER — TELEPHONE (OUTPATIENT)
Dept: FAMILY MEDICINE CLINIC | Age: 58
End: 2023-06-26

## 2023-08-23 ENCOUNTER — PATIENT MESSAGE (OUTPATIENT)
Dept: FAMILY MEDICINE CLINIC | Age: 58
End: 2023-08-23

## 2023-08-23 ENCOUNTER — TELEPHONE (OUTPATIENT)
Dept: FAMILY MEDICINE CLINIC | Age: 58
End: 2023-08-23

## 2023-08-23 DIAGNOSIS — G93.32 CHRONIC FATIGUE SYNDROME: Primary | ICD-10-CM

## 2023-08-23 NOTE — TELEPHONE ENCOUNTER
Please let patient know that fasting labs have been ordered for her to do approximately 1 week before her appointment. Also, patient's last CT from August 2022 was read as no changes to the lung nodule and if patient did not smoke nor have risk factors then no further imaging was needed. With patient's history of sarcoidosis, I did order a 1 year repeat of the CT. Thank you.

## 2023-08-23 NOTE — PROGRESS NOTES
Please let patient know that fasting labs have been ordered for her to do approximately 1 week before her appointment. Patient's last CT from August 2022 was read as no changes to the lung nodule and if patient does not smoke nor have risk factors then no further imaging was needed. However, with patient's history of sarcoidosis I did order a 1 year repeat of the CT. Thank you.

## 2023-08-23 NOTE — TELEPHONE ENCOUNTER
Patient wants to know why Edyta Hawthorne put a referral in for a ct scan. Also patient wants to know if she is to have lab work before her physical on 10-23 . If she is to have labs drawn please place lab orders. Please call patient to inform her about the scan. Hydroquinone Counseling:  Patient advised that medication may result in skin irritation, lightening (hypopigmentation), dryness, and burning.  In the event of skin irritation, the patient was advised to reduce the amount of the drug applied or use it less frequently.  Rarely, spots that are treated with hydroquinone can become darker (pseudoochronosis).  Should this occur, patient instructed to stop medication and call the office. The patient verbalized understanding of the proper use and possible adverse effects of hydroquinone.  All of the patient's questions and concerns were addressed.

## 2023-08-24 NOTE — TELEPHONE ENCOUNTER
Spoke with patient and informed of message. She thought the repeat would be every 5 years, that is what her oncologist told her. She wants to verify that you would still like it repeated this year?

## 2023-08-24 NOTE — TELEPHONE ENCOUNTER
From: Poli Pillai  Sent: 8/23/2023 3:31 PM EDT  To: Cathy Gotti MA  Subject: Mammogram due    My last mammogram was on 2/2/22023 at [facility]

## 2023-08-24 NOTE — TELEPHONE ENCOUNTER
Please let patient know that I was basing my recommendation off of the radiologist read of her last CT. If her oncologist feels that she is okay to wait 5 years then that is fine.   I do recommend that she double check with her oncologist.  Thank you

## 2023-09-13 ENCOUNTER — HOSPITAL ENCOUNTER (OUTPATIENT)
Age: 58
Discharge: HOME | End: 2023-09-13
Payer: COMMERCIAL

## 2023-09-13 DIAGNOSIS — G93.32 CHRONIC FATIGUE SYNDROME: ICD-10-CM

## 2023-09-13 LAB
ALBUMIN SERPL-MCNC: 4.5 G/DL (ref 3.4–5)
ALBUMIN/GLOB SERPL: 2 {RATIO} (ref 1.1–2.2)
ALP SERPL-CCNC: 70 U/L (ref 40–129)
ALT SERPL-CCNC: 10 U/L (ref 10–40)
ANION GAP SERPL CALCULATED.3IONS-SCNC: 11 MMOL/L (ref 3–16)
AST SERPL-CCNC: 24 U/L (ref 15–37)
BASOPHILS # BLD: 0 K/UL (ref 0–0.2)
BASOPHILS NFR BLD: 0.6 %
BILIRUB SERPL-MCNC: 0.4 MG/DL (ref 0–1)
BUN SERPL-MCNC: 9 MG/DL (ref 7–20)
CALCIUM SERPL-MCNC: 9.4 MG/DL (ref 8.3–10.6)
CHLORIDE SERPL-SCNC: 104 MMOL/L (ref 99–110)
CHOLEST SERPL-MCNC: 194 MG/DL (ref 0–199)
CO2 SERPL-SCNC: 23 MMOL/L (ref 21–32)
CREAT SERPL-MCNC: 0.7 MG/DL (ref 0.6–1.1)
DEPRECATED RDW RBC AUTO: 14.2 % (ref 12.4–15.4)
EOSINOPHIL # BLD: 0.1 K/UL (ref 0–0.6)
EOSINOPHIL NFR BLD: 2.7 %
GFR SERPLBLD CREATININE-BSD FMLA CKD-EPI: >60 ML/MIN/{1.73_M2}
GLUCOSE SERPL-MCNC: 82 MG/DL (ref 70–99)
HCT VFR BLD AUTO: 38 % (ref 36–48)
HDLC SERPL-MCNC: 74 MG/DL (ref 40–60)
HGB BLD-MCNC: 13.2 G/DL (ref 12–16)
LDLC SERPL CALC-MCNC: 106 MG/DL
LYMPHOCYTES # BLD: 1.3 K/UL (ref 1–5.1)
LYMPHOCYTES NFR BLD: 31.1 %
MCH RBC QN AUTO: 31.7 PG (ref 26–34)
MCHC RBC AUTO-ENTMCNC: 34.8 G/DL (ref 31–36)
MCV RBC AUTO: 91.3 FL (ref 80–100)
MONOCYTES # BLD: 0.5 K/UL (ref 0–1.3)
MONOCYTES NFR BLD: 12.1 %
NEUTROPHILS # BLD: 2.2 K/UL (ref 1.7–7.7)
NEUTROPHILS NFR BLD: 53.5 %
PLATELET # BLD AUTO: 220 K/UL (ref 135–450)
PMV BLD AUTO: 10 FL (ref 5–10.5)
POTASSIUM SERPL-SCNC: 4.6 MMOL/L (ref 3.5–5.1)
PROT SERPL-MCNC: 6.8 G/DL (ref 6.4–8.2)
RBC # BLD AUTO: 4.16 M/UL (ref 4–5.2)
SODIUM SERPL-SCNC: 138 MMOL/L (ref 136–145)
TRIGL SERPL-MCNC: 70 MG/DL (ref 0–150)
TSH SERPL DL<=0.005 MIU/L-ACNC: 2.23 UIU/ML (ref 0.27–4.2)
VLDLC SERPL CALC-MCNC: 14 MG/DL
WBC # BLD AUTO: 4.1 K/UL (ref 4–11)

## 2023-09-13 PROCEDURE — 80061 LIPID PANEL: CPT

## 2023-09-13 PROCEDURE — 84443 ASSAY THYROID STIM HORMONE: CPT

## 2023-09-13 PROCEDURE — 85025 COMPLETE CBC W/AUTO DIFF WBC: CPT

## 2023-09-13 PROCEDURE — 80053 COMPREHEN METABOLIC PANEL: CPT

## 2023-09-13 PROCEDURE — 36415 COLL VENOUS BLD VENIPUNCTURE: CPT

## 2023-10-18 ENCOUNTER — OFFICE VISIT (OUTPATIENT)
Dept: FAMILY MEDICINE CLINIC | Age: 58
End: 2023-10-18
Payer: COMMERCIAL

## 2023-10-18 VITALS
RESPIRATION RATE: 16 BRPM | OXYGEN SATURATION: 99 % | DIASTOLIC BLOOD PRESSURE: 75 MMHG | SYSTOLIC BLOOD PRESSURE: 111 MMHG | HEART RATE: 66 BPM | WEIGHT: 148.8 LBS | HEIGHT: 69 IN | TEMPERATURE: 96.9 F | BODY MASS INDEX: 22.04 KG/M2

## 2023-10-18 DIAGNOSIS — Z00.00 ANNUAL PHYSICAL EXAM: Primary | ICD-10-CM

## 2023-10-18 DIAGNOSIS — F43.21 GRIEF: ICD-10-CM

## 2023-10-18 DIAGNOSIS — M85.89 OSTEOPENIA OF MULTIPLE SITES: ICD-10-CM

## 2023-10-18 PROCEDURE — 99396 PREV VISIT EST AGE 40-64: CPT | Performed by: FAMILY MEDICINE

## 2023-10-18 SDOH — ECONOMIC STABILITY: FOOD INSECURITY: WITHIN THE PAST 12 MONTHS, THE FOOD YOU BOUGHT JUST DIDN'T LAST AND YOU DIDN'T HAVE MONEY TO GET MORE.: NEVER TRUE

## 2023-10-18 SDOH — ECONOMIC STABILITY: INCOME INSECURITY: HOW HARD IS IT FOR YOU TO PAY FOR THE VERY BASICS LIKE FOOD, HOUSING, MEDICAL CARE, AND HEATING?: NOT HARD AT ALL

## 2023-10-18 SDOH — ECONOMIC STABILITY: HOUSING INSECURITY
IN THE LAST 12 MONTHS, WAS THERE A TIME WHEN YOU DID NOT HAVE A STEADY PLACE TO SLEEP OR SLEPT IN A SHELTER (INCLUDING NOW)?: NO

## 2023-10-18 SDOH — ECONOMIC STABILITY: FOOD INSECURITY: WITHIN THE PAST 12 MONTHS, YOU WORRIED THAT YOUR FOOD WOULD RUN OUT BEFORE YOU GOT MONEY TO BUY MORE.: NEVER TRUE

## 2023-10-18 ASSESSMENT — PATIENT HEALTH QUESTIONNAIRE - PHQ9
SUM OF ALL RESPONSES TO PHQ QUESTIONS 1-9: 2
1. LITTLE INTEREST OR PLEASURE IN DOING THINGS: SEVERAL DAYS
SUM OF ALL RESPONSES TO PHQ QUESTIONS 1-9: 2
2. FEELING DOWN, DEPRESSED OR HOPELESS: 1
2. FEELING DOWN, DEPRESSED OR HOPELESS: SEVERAL DAYS
1. LITTLE INTEREST OR PLEASURE IN DOING THINGS: 1
SUM OF ALL RESPONSES TO PHQ9 QUESTIONS 1 & 2: 2
SUM OF ALL RESPONSES TO PHQ9 QUESTIONS 1 & 2: 2
SUM OF ALL RESPONSES TO PHQ QUESTIONS 1-9: 2
SUM OF ALL RESPONSES TO PHQ QUESTIONS 1-9: 2

## 2023-10-18 ASSESSMENT — ENCOUNTER SYMPTOMS
ABDOMINAL PAIN: 0
COLOR CHANGE: 0
SHORTNESS OF BREATH: 0

## 2023-10-18 NOTE — PROGRESS NOTES
Tashi Myles  YOB: 1965    Date of Service:  10/18/2023    Chief Complaint:   Tashi Myles is a 60 y.o. female who presents for complete physical examination. HPI:  HPI  Patient labs from September 13, 2023 within normal limits except for HDL 74 and .      Hx abnormal PAP: no  Sexual activity: none   Self-breast exams: yes  Previous DEXA scan: yes- 2010, abnormal: Osteopenia  Last eye exam: March 2023, normal  Exercise: walks, yoga, yard work, jumping rope  Seatbelt use: Usually  Are You a Spiritual Person: Yes  Advance Directive: Yes    Wt Readings from Last 3 Encounters:   10/18/23 67.5 kg (148 lb 12.8 oz)   10/11/22 64.4 kg (142 lb)   09/19/22 64.8 kg (142 lb 12.8 oz)     BP Readings from Last 3 Encounters:   10/18/23 111/75   09/19/22 121/66   01/15/22 (!) 114/58       Patient Active Problem List   Diagnosis    Myofascial pain    Polyarthralgia    Sarcoidosis    Synovitis and tenosynovitis    Nonspecific abnormal results of other endocrine function study    Fibromyalgia    Chronic fatigue syndrome    Calculus of kidney    Arthropathy       Health Maintenance   Topic Date Due    Hepatitis B vaccine (1 of 3 - 3-dose series) Never done    COVID-19 Vaccine (4 - Moderna series) 02/10/2022    Cervical cancer screen  05/01/2022    Flu vaccine (1) Never done    Depression Screen  10/18/2024    Breast cancer screen  02/02/2025    Lipids  09/13/2028    Colorectal Cancer Screen  12/06/2029    DTaP/Tdap/Td vaccine (3 - Td or Tdap) 01/15/2032    Shingles vaccine  Completed    Hepatitis C screen  Completed    HIV screen  Completed    Hepatitis A vaccine  Aged Out    Hib vaccine  Aged Out    Meningococcal (ACWY) vaccine  Aged Out    Pneumococcal 0-64 years Vaccine  Aged [employer] History   Administered Date(s) Administered    COVID-19, MODERNA BLUE border, Primary or Immunocompromised, (age 12y+), IM, 100 mcg/0.5mL 03/09/2021, 04/06/2021    COVID-19, PFIZER PURPLE top, DILUTE for

## 2023-11-29 ENCOUNTER — HOSPITAL ENCOUNTER (OUTPATIENT)
Dept: WOMENS IMAGING | Age: 58
Discharge: HOME | End: 2023-11-29
Attending: FAMILY MEDICINE
Payer: COMMERCIAL

## 2023-11-29 DIAGNOSIS — M85.89 OSTEOPENIA OF MULTIPLE SITES: ICD-10-CM

## 2023-11-29 PROCEDURE — 77080 DXA BONE DENSITY AXIAL: CPT

## 2024-03-13 ENCOUNTER — OFFICE VISIT (OUTPATIENT)
Dept: FAMILY MEDICINE CLINIC | Age: 59
End: 2024-03-13
Payer: COMMERCIAL

## 2024-03-13 VITALS
HEIGHT: 69 IN | HEART RATE: 78 BPM | WEIGHT: 151.6 LBS | SYSTOLIC BLOOD PRESSURE: 119 MMHG | BODY MASS INDEX: 22.45 KG/M2 | OXYGEN SATURATION: 100 % | DIASTOLIC BLOOD PRESSURE: 73 MMHG

## 2024-03-13 DIAGNOSIS — M79.672 HEEL PAIN, BILATERAL: Primary | ICD-10-CM

## 2024-03-13 DIAGNOSIS — M79.671 HEEL PAIN, BILATERAL: Primary | ICD-10-CM

## 2024-03-13 PROCEDURE — 99213 OFFICE O/P EST LOW 20 MIN: CPT | Performed by: FAMILY MEDICINE

## 2024-03-13 RX ORDER — MELOXICAM 15 MG/1
15 TABLET ORAL DAILY
Qty: 10 TABLET | Refills: 0 | Status: SHIPPED | OUTPATIENT
Start: 2024-03-13

## 2024-03-13 ASSESSMENT — PATIENT HEALTH QUESTIONNAIRE - PHQ9
SUM OF ALL RESPONSES TO PHQ9 QUESTIONS 1 & 2: 0
SUM OF ALL RESPONSES TO PHQ QUESTIONS 1-9: 0
2. FEELING DOWN, DEPRESSED OR HOPELESS: 0
SUM OF ALL RESPONSES TO PHQ QUESTIONS 1-9: 0
1. LITTLE INTEREST OR PLEASURE IN DOING THINGS: 0
SUM OF ALL RESPONSES TO PHQ QUESTIONS 1-9: 0
SUM OF ALL RESPONSES TO PHQ QUESTIONS 1-9: 0

## 2024-03-13 NOTE — PROGRESS NOTES
3/13/2024    This is a 58 y.o. female   Chief Complaint   Patient presents with    Foot Pain     Pain back of both heels, since Fall gradually getting worse.   .    HPI  Pt presents today for the following:    Bilateral Heel Pain: Sx began in the fall of 2023, worsening, pain described as a throb worse in the morning, stiff and the heels hurt, denies trauma or triggers, started doing more exercising, walks sometimes 2 miles a day, pain present whether or not she exercises, no previous episodes, uses tea relief cream that helps.   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 [facility]    Polyarthralgia     chronic    Sarcoidosis     [facility]- [facility]. Parsons, [facility].  Dr. Elvira Simmons.         Hospital Outpatient Visit on 09/13/2023   Component Date Value Ref Range Status    Sodium 09/13/2023 138  136 - 145 mmol/L Final    Potassium 09/13/2023 4.6  3.5 - 5.1 mmol/L Final    Chloride 09/13/2023 104  99 - 110 mmol/L Final    CO2 09/13/2023 23  21 - 32 mmol/L Final    Anion Gap 09/13/2023 11  3 - 16 Final    Glucose 09/13/2023 82  70 - 99 mg/dL Final    BUN 09/13/2023 9  7 - 20 mg/dL Final    Creatinine 09/13/2023 0.7  0.6 - 1.1 mg/dL Final    Est, Glom Filt Rate 09/13/2023 >60  >60 Final    Comment: Pediatric calculator link  https://www.kidney.org/professionals/kdoqi/gfr_calculatorped  Effective Oct 3, 2022  These results are not intended for use in patients  <18 years of age.  eGFR results are calculated without  a race factor using the 2021 CKD-EPI equation.  Careful  clinical correlation is recommended, particularly when  comparing to results calculated using previous equations.  The CKD-EPI equation is less accurate in patients with  extremes of muscle mass, extra-renal

## 2025-01-02 ENCOUNTER — PATIENT MESSAGE (OUTPATIENT)
Dept: FAMILY MEDICINE CLINIC | Age: 60
End: 2025-01-02

## 2025-01-02 ENCOUNTER — TRANSCRIBE ORDERS (OUTPATIENT)
Dept: ADMINISTRATIVE | Age: 60
End: 2025-01-02

## 2025-01-02 DIAGNOSIS — Z12.12 SCREENING FOR COLORECTAL CANCER: Primary | ICD-10-CM

## 2025-01-02 DIAGNOSIS — Z12.11 SCREENING FOR COLORECTAL CANCER: Primary | ICD-10-CM

## 2025-01-02 DIAGNOSIS — E63.8 OTHER SPECIFIED NUTRITIONAL DEFICIENCIES: Primary | ICD-10-CM

## 2025-01-02 DIAGNOSIS — N92.0 EXCESSIVE OR FREQUENT MENSTRUATION: ICD-10-CM

## 2025-01-02 DIAGNOSIS — M79.7 FIBROMYALGIA: ICD-10-CM

## 2025-01-02 DIAGNOSIS — D86.9 SARCOIDOSIS: ICD-10-CM

## 2025-08-14 ENCOUNTER — OFFICE VISIT (OUTPATIENT)
Dept: FAMILY MEDICINE CLINIC | Age: 60
End: 2025-08-14
Payer: COMMERCIAL

## 2025-08-14 VITALS
TEMPERATURE: 97.5 F | RESPIRATION RATE: 16 BRPM | WEIGHT: 144.2 LBS | OXYGEN SATURATION: 99 % | BODY MASS INDEX: 21.36 KG/M2 | DIASTOLIC BLOOD PRESSURE: 70 MMHG | HEIGHT: 69 IN | SYSTOLIC BLOOD PRESSURE: 100 MMHG | HEART RATE: 92 BPM

## 2025-08-14 DIAGNOSIS — M85.89 OSTEOPENIA OF MULTIPLE SITES: ICD-10-CM

## 2025-08-14 DIAGNOSIS — Z00.00 ANNUAL PHYSICAL EXAM: Primary | ICD-10-CM

## 2025-08-14 PROCEDURE — 99396 PREV VISIT EST AGE 40-64: CPT | Performed by: FAMILY MEDICINE

## 2025-08-14 SDOH — ECONOMIC STABILITY: FOOD INSECURITY: WITHIN THE PAST 12 MONTHS, YOU WORRIED THAT YOUR FOOD WOULD RUN OUT BEFORE YOU GOT MONEY TO BUY MORE.: NEVER TRUE

## 2025-08-14 SDOH — ECONOMIC STABILITY: FOOD INSECURITY: WITHIN THE PAST 12 MONTHS, THE FOOD YOU BOUGHT JUST DIDN'T LAST AND YOU DIDN'T HAVE MONEY TO GET MORE.: NEVER TRUE

## 2025-08-14 ASSESSMENT — PATIENT HEALTH QUESTIONNAIRE - PHQ9
SUM OF ALL RESPONSES TO PHQ9 QUESTIONS 1 & 2: 0
SUM OF ALL RESPONSES TO PHQ QUESTIONS 1-9: 0
1. LITTLE INTEREST OR PLEASURE IN DOING THINGS: NOT AT ALL
SUM OF ALL RESPONSES TO PHQ QUESTIONS 1-9: 0
2. FEELING DOWN, DEPRESSED OR HOPELESS: NOT AT ALL
1. LITTLE INTEREST OR PLEASURE IN DOING THINGS: NOT AT ALL
SUM OF ALL RESPONSES TO PHQ QUESTIONS 1-9: 0
2. FEELING DOWN, DEPRESSED OR HOPELESS: NOT AT ALL
SUM OF ALL RESPONSES TO PHQ QUESTIONS 1-9: 0

## 2025-08-14 ASSESSMENT — ENCOUNTER SYMPTOMS
COLOR CHANGE: 0
ABDOMINAL PAIN: 0
SHORTNESS OF BREATH: 0